# Patient Record
Sex: FEMALE | Race: WHITE
[De-identification: names, ages, dates, MRNs, and addresses within clinical notes are randomized per-mention and may not be internally consistent; named-entity substitution may affect disease eponyms.]

---

## 2017-06-15 ENCOUNTER — HOSPITAL ENCOUNTER (OUTPATIENT)
Dept: HOSPITAL 53 - M SFHCPLAZ | Age: 82
End: 2017-06-15
Attending: FAMILY MEDICINE
Payer: MEDICARE

## 2017-06-15 DIAGNOSIS — K62.5: Primary | ICD-10-CM

## 2017-06-15 DIAGNOSIS — I11.9: ICD-10-CM

## 2017-06-15 LAB
ANION GAP SERPL CALC-SCNC: 7 MEQ/L (ref 8–16)
BUN SERPL-MCNC: 27 MG/DL (ref 7–18)
CALCIUM SERPL-MCNC: 9.5 MG/DL (ref 8.8–10.2)
CHLORIDE SERPL-SCNC: 97 MEQ/L (ref 98–107)
CO2 SERPL-SCNC: 30 MEQ/L (ref 21–32)
CREAT SERPL-MCNC: 1.13 MG/DL (ref 0.55–1.02)
ERYTHROCYTE [DISTWIDTH] IN BLOOD BY AUTOMATED COUNT: 13.6 % (ref 11.5–14.5)
GFR SERPL CREATININE-BSD FRML MDRD: 48.7 ML/MIN/{1.73_M2} (ref 32–?)
GLUCOSE SERPL-MCNC: 89 MG/DL (ref 83–110)
MCH RBC QN AUTO: 31.3 PG (ref 27–33)
MCHC RBC AUTO-ENTMCNC: 34.5 G/DL (ref 32–36.5)
MCV RBC AUTO: 90.8 FL (ref 80–96)
PLATELET # BLD AUTO: 206 K/MM3 (ref 150–450)
POTASSIUM SERPL-SCNC: 4 MEQ/L (ref 3.5–5.1)
SODIUM SERPL-SCNC: 134 MEQ/L (ref 136–145)
WBC # BLD AUTO: 8.8 K/MM3 (ref 4–10)

## 2017-06-15 PROCEDURE — 85027 COMPLETE CBC AUTOMATED: CPT

## 2017-06-15 PROCEDURE — 80048 BASIC METABOLIC PNL TOTAL CA: CPT

## 2017-12-18 ENCOUNTER — HOSPITAL ENCOUNTER (OUTPATIENT)
Dept: HOSPITAL 53 - M RAD | Age: 82
End: 2017-12-18
Attending: FAMILY MEDICINE
Payer: MEDICARE

## 2017-12-18 DIAGNOSIS — R09.89: Primary | ICD-10-CM

## 2017-12-18 NOTE — REP
CAROTID ULTRASOUND:

 

Real-time ultrasound evaluation and duplex Doppler interrogation of the

extracranial carotid vasculature is performed.  There is mild plaquing and

narrowing in both carotid bulbs extending into the internal and external carotid

arteries.  Luminal narrowing is less than 50%.  There is no evidence of

hemodynamically significant stenosis of either internal carotid artery.  Normal

flow velocities are seen. The vertebral arteries demonstrate normal direction of

flow.

 

                                                                RIGHT

LEFT

 

Peak systolic velocity ICA                   60.1 cm/s                      77.7

cm/s

 

End diastolic velocity ICA                  11.7 cm/s                      14.6

cm/s

 

Peak systolic velocity CCA                  64 cm/s                     75.6

cm/s

 

Peak systolic velocity ECA                  63.5 cm/s                     95.4

cm/s

 

ICA/CCA ratio                              0.94

1.03

 

IMPRESSION:

 

Bilateral luminal narrowing of the internal carotid arteries less than 50%.  No

evidence of hemodynamically significant stenosis.

 

 

Signed by

Rodríguez Fernandez MD 12/18/2017 02:25 P

## 2018-01-09 ENCOUNTER — HOSPITAL ENCOUNTER (OUTPATIENT)
Dept: HOSPITAL 53 - M SFHCPLAZ | Age: 83
End: 2018-01-09
Attending: FAMILY MEDICINE
Payer: MEDICARE

## 2018-01-09 DIAGNOSIS — I11.9: Primary | ICD-10-CM

## 2018-01-09 DIAGNOSIS — E03.9: ICD-10-CM

## 2018-01-09 DIAGNOSIS — E83.42: ICD-10-CM

## 2018-01-09 LAB
ANION GAP: 9 MEQ/L (ref 8–16)
BLOOD UREA NITROGEN: 29 MG/DL (ref 7–18)
CALCIUM LEVEL: 8.8 MG/DL (ref 8.8–10.2)
CARBON DIOXIDE LEVEL: 27 MEQ/L (ref 21–32)
CHLORIDE LEVEL: 102 MEQ/L (ref 98–107)
CREATININE FOR GFR: 1.15 MG/DL (ref 0.55–1.02)
GFR SERPL CREATININE-BSD FRML MDRD: 47.6 ML/MIN/{1.73_M2} (ref 32–?)
GLUCOSE, FASTING: 113 MG/DL (ref 83–110)
MAGNESIUM LEVEL: 2.5 MG/DL (ref 1.8–2.4)
POTASSIUM SERUM: 4.2 MEQ/L (ref 3.5–5.1)
SODIUM LEVEL: 138 MEQ/L (ref 136–145)
THYROID STIMULATING HORMONE: 6.96 UIU/ML (ref 0.36–3.74)

## 2018-01-09 PROCEDURE — 83735 ASSAY OF MAGNESIUM: CPT

## 2018-06-22 ENCOUNTER — HOSPITAL ENCOUNTER (EMERGENCY)
Dept: HOSPITAL 53 - M ED | Age: 83
Discharge: HOME | End: 2018-06-22
Payer: MEDICARE

## 2018-06-22 DIAGNOSIS — Z88.1: ICD-10-CM
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
## 2018-06-22 DIAGNOSIS — Z98.61: ICD-10-CM

## 2018-06-22 DIAGNOSIS — S50.02XA: Primary | ICD-10-CM

## 2018-06-22 DIAGNOSIS — Z79.01: ICD-10-CM

## 2018-06-22 DIAGNOSIS — I10: ICD-10-CM

## 2018-06-22 DIAGNOSIS — Z79.899: ICD-10-CM
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
## 2018-06-22 DIAGNOSIS — M19.022: ICD-10-CM

## 2018-06-22 DIAGNOSIS — I25.10: ICD-10-CM

## 2018-06-22 DIAGNOSIS — M54.9: ICD-10-CM
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
## 2018-06-22 DIAGNOSIS — K21.9: ICD-10-CM
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
## 2018-06-22 PROCEDURE — 73080 X-RAY EXAM OF ELBOW: CPT

## 2018-07-27 ENCOUNTER — HOSPITAL ENCOUNTER (OUTPATIENT)
Dept: HOSPITAL 53 - M LABDRAW1 | Age: 83
End: 2018-07-27
Attending: INTERNAL MEDICINE
Payer: MEDICARE

## 2018-07-27 DIAGNOSIS — I48.91: Primary | ICD-10-CM

## 2018-07-27 LAB
ALBUMIN/GLOBULIN RATIO: 1.31 (ref 1–1.93)
ALBUMIN: 3.8 GM/DL (ref 3.2–5.2)
ALKALINE PHOSPHATASE: 59 U/L (ref 45–117)
ALT SERPL W P-5'-P-CCNC: 21 U/L (ref 12–78)
ANION GAP: 9 MEQ/L (ref 8–16)
AST SERPL-CCNC: 21 U/L (ref 7–37)
BILIRUBIN,TOTAL: 1.2 MG/DL (ref 0.2–1)
BLOOD UREA NITROGEN: 26 MG/DL (ref 7–18)
CALCIUM LEVEL: 9.1 MG/DL (ref 8.8–10.2)
CARBON DIOXIDE LEVEL: 27 MEQ/L (ref 21–32)
CHLORIDE LEVEL: 103 MEQ/L (ref 98–107)
CREATININE FOR GFR: 1.23 MG/DL (ref 0.55–1.3)
DIGOXIN LEVEL: 1.6 NG/ML (ref 0.5–2)
GFR SERPL CREATININE-BSD FRML MDRD: 44.1 ML/MIN/{1.73_M2} (ref 32–?)
GLUCOSE, FASTING: 91 MG/DL (ref 70–100)
HEMATOCRIT: 32.6 % (ref 36–47)
HEMOGLOBIN: 10.9 G/DL (ref 12–15.5)
MEAN CORPUSCULAR HEMOGLOBIN: 29.9 PG (ref 27–33)
MEAN CORPUSCULAR HGB CONC: 33.4 G/DL (ref 32–36.5)
MEAN CORPUSCULAR VOLUME: 89.6 FL (ref 80–96)
NRBC BLD AUTO-RTO: 0 % (ref 0–0)
NT-PRO BNP: 6406 PG/ML (ref ?–450)
PLATELET COUNT, AUTOMATED: 170 10^3/UL (ref 150–450)
POTASSIUM SERUM: 3.9 MEQ/L (ref 3.5–5.1)
RED BLOOD COUNT: 3.64 10^6/UL (ref 4–5.4)
RED CELL DISTRIBUTION WIDTH: 14 % (ref 11.5–14.5)
SODIUM LEVEL: 139 MEQ/L (ref 136–145)
TOTAL PROTEIN: 6.7 GM/DL (ref 6.4–8.2)
TROPONIN I: 0.02 NG/ML (ref ?–0.1)
WHITE BLOOD COUNT: 8 10^3/UL (ref 4–10)

## 2018-07-27 PROCEDURE — 80162 ASSAY OF DIGOXIN TOTAL: CPT

## 2019-08-14 ENCOUNTER — HOSPITAL ENCOUNTER (OUTPATIENT)
Dept: HOSPITAL 53 - M SFHCPLAZ | Age: 84
End: 2019-08-14
Attending: FAMILY MEDICINE
Payer: MEDICARE

## 2019-08-14 DIAGNOSIS — N18.3: ICD-10-CM

## 2019-08-14 DIAGNOSIS — E83.42: Primary | ICD-10-CM

## 2019-08-14 DIAGNOSIS — E03.9: ICD-10-CM

## 2019-08-14 LAB
BUN SERPL-MCNC: 38 MG/DL (ref 7–18)
CALCIUM SERPL-MCNC: 9.5 MG/DL (ref 8.8–10.2)
CHLORIDE SERPL-SCNC: 103 MEQ/L (ref 98–107)
CO2 SERPL-SCNC: 30 MEQ/L (ref 21–32)
CREAT SERPL-MCNC: 1.56 MG/DL (ref 0.55–1.3)
GFR SERPL CREATININE-BSD FRML MDRD: 33.3 ML/MIN/{1.73_M2} (ref 32–?)
GLUCOSE SERPL-MCNC: 99 MG/DL (ref 70–100)
HCT VFR BLD AUTO: 36.4 % (ref 36–47)
HGB BLD-MCNC: 11.7 G/DL (ref 12–15.5)
MAGNESIUM SERPL-MCNC: 2.5 MG/DL (ref 1.8–2.4)
MCH RBC QN AUTO: 30.5 PG (ref 27–33)
MCHC RBC AUTO-ENTMCNC: 32.1 G/DL (ref 32–36.5)
MCV RBC AUTO: 95 FL (ref 80–96)
PLATELET # BLD AUTO: 211 10^3/UL (ref 150–450)
POTASSIUM SERPL-SCNC: 4.3 MEQ/L (ref 3.5–5.1)
RBC # BLD AUTO: 3.83 10^6/UL (ref 4–5.4)
SODIUM SERPL-SCNC: 139 MEQ/L (ref 136–145)
TSH SERPL DL<=0.005 MIU/L-ACNC: 0.99 UIU/ML (ref 0.36–3.74)
WBC # BLD AUTO: 7.2 10^3/UL (ref 4–10)

## 2019-08-26 ENCOUNTER — HOSPITAL ENCOUNTER (OUTPATIENT)
Dept: HOSPITAL 53 - M SFHCPLAZ | Age: 84
End: 2019-08-26
Attending: FAMILY MEDICINE
Payer: MEDICARE

## 2019-08-26 DIAGNOSIS — N18.3: Primary | ICD-10-CM

## 2019-08-26 LAB
BUN SERPL-MCNC: 32 MG/DL (ref 7–18)
CALCIUM SERPL-MCNC: 9.1 MG/DL (ref 8.8–10.2)
CHLORIDE SERPL-SCNC: 103 MEQ/L (ref 98–107)
CO2 SERPL-SCNC: 25 MEQ/L (ref 21–32)
CREAT SERPL-MCNC: 1.38 MG/DL (ref 0.55–1.3)
GFR SERPL CREATININE-BSD FRML MDRD: 38.4 ML/MIN/{1.73_M2} (ref 32–?)
GLUCOSE SERPL-MCNC: 84 MG/DL (ref 70–100)
POTASSIUM SERPL-SCNC: 4.1 MEQ/L (ref 3.5–5.1)
SODIUM SERPL-SCNC: 135 MEQ/L (ref 136–145)

## 2019-11-19 ENCOUNTER — HOSPITAL ENCOUNTER (OUTPATIENT)
Dept: HOSPITAL 53 - M SFHCPLAZ | Age: 84
End: 2019-11-19
Attending: FAMILY MEDICINE
Payer: MEDICARE

## 2019-11-19 DIAGNOSIS — N18.3: ICD-10-CM

## 2019-11-19 DIAGNOSIS — E83.42: ICD-10-CM

## 2019-11-19 DIAGNOSIS — I11.9: Primary | ICD-10-CM

## 2019-11-19 LAB
BUN SERPL-MCNC: 24 MG/DL (ref 7–18)
CALCIUM SERPL-MCNC: 8.7 MG/DL (ref 8.8–10.2)
CHLORIDE SERPL-SCNC: 102 MEQ/L (ref 98–107)
CO2 SERPL-SCNC: 27 MEQ/L (ref 21–32)
CREAT SERPL-MCNC: 1.24 MG/DL (ref 0.55–1.3)
GFR SERPL CREATININE-BSD FRML MDRD: 43.5 ML/MIN/{1.73_M2} (ref 32–?)
GLUCOSE SERPL-MCNC: 91 MG/DL (ref 70–100)
MAGNESIUM SERPL-MCNC: 1.9 MG/DL (ref 1.8–2.4)
POTASSIUM SERPL-SCNC: 4.3 MEQ/L (ref 3.5–5.1)
SODIUM SERPL-SCNC: 136 MEQ/L (ref 136–145)

## 2019-11-19 PROCEDURE — 83735 ASSAY OF MAGNESIUM: CPT

## 2019-11-19 PROCEDURE — 36415 COLL VENOUS BLD VENIPUNCTURE: CPT

## 2019-11-19 PROCEDURE — 80048 BASIC METABOLIC PNL TOTAL CA: CPT

## 2020-02-24 ENCOUNTER — HOSPITAL ENCOUNTER (OUTPATIENT)
Dept: HOSPITAL 53 - M PLALAB | Age: 85
End: 2020-02-24
Attending: FAMILY MEDICINE
Payer: MEDICARE

## 2020-02-24 DIAGNOSIS — I11.9: Primary | ICD-10-CM

## 2020-02-24 DIAGNOSIS — N18.3: ICD-10-CM

## 2020-02-24 LAB
BUN SERPL-MCNC: 24 MG/DL (ref 7–18)
CALCIUM SERPL-MCNC: 8.7 MG/DL (ref 8.8–10.2)
CHLORIDE SERPL-SCNC: 109 MEQ/L (ref 98–107)
CO2 SERPL-SCNC: 26 MEQ/L (ref 21–32)
CREAT SERPL-MCNC: 1.14 MG/DL (ref 0.55–1.3)
GFR SERPL CREATININE-BSD FRML MDRD: 47.9 ML/MIN/{1.73_M2} (ref 32–?)
GLUCOSE SERPL-MCNC: 97 MG/DL (ref 70–100)
HCT VFR BLD AUTO: 31.1 % (ref 36–47)
HGB BLD-MCNC: 10.1 G/DL (ref 12–15.5)
MCH RBC QN AUTO: 31.1 PG (ref 27–33)
MCHC RBC AUTO-ENTMCNC: 32.5 G/DL (ref 32–36.5)
MCV RBC AUTO: 95.7 FL (ref 80–96)
PLATELET # BLD AUTO: 185 10^3/UL (ref 150–450)
POTASSIUM SERPL-SCNC: 4.2 MEQ/L (ref 3.5–5.1)
RBC # BLD AUTO: 3.25 10^6/UL (ref 4–5.4)
SODIUM SERPL-SCNC: 140 MEQ/L (ref 136–145)
WBC # BLD AUTO: 4.7 10^3/UL (ref 4–10)

## 2020-02-24 PROCEDURE — 80048 BASIC METABOLIC PNL TOTAL CA: CPT

## 2020-02-24 PROCEDURE — 36415 COLL VENOUS BLD VENIPUNCTURE: CPT

## 2020-02-24 PROCEDURE — 85027 COMPLETE CBC AUTOMATED: CPT

## 2020-02-25 ENCOUNTER — HOSPITAL ENCOUNTER (EMERGENCY)
Dept: HOSPITAL 53 - M ED | Age: 85
Discharge: HOME | End: 2020-02-25
Payer: MEDICARE

## 2020-02-25 VITALS — DIASTOLIC BLOOD PRESSURE: 92 MMHG | SYSTOLIC BLOOD PRESSURE: 144 MMHG

## 2020-02-25 VITALS — HEIGHT: 61 IN | WEIGHT: 172.36 LBS | BODY MASS INDEX: 32.54 KG/M2

## 2020-02-25 DIAGNOSIS — Z79.899: ICD-10-CM

## 2020-02-25 DIAGNOSIS — D68.318: ICD-10-CM

## 2020-02-25 DIAGNOSIS — Z79.890: ICD-10-CM

## 2020-02-25 DIAGNOSIS — Z79.01: ICD-10-CM

## 2020-02-25 DIAGNOSIS — R04.0: Primary | ICD-10-CM

## 2020-02-25 DIAGNOSIS — Z88.1: ICD-10-CM

## 2020-02-25 LAB
HCT VFR BLD AUTO: 30.5 % (ref 36–47)
HGB BLD-MCNC: 9.7 G/DL (ref 12–15.5)
INR PPP: 1.93
MCH RBC QN AUTO: 29.9 PG (ref 27–33)
MCHC RBC AUTO-ENTMCNC: 31.8 G/DL (ref 32–36.5)
MCV RBC AUTO: 94.1 FL (ref 80–96)
PLATELET # BLD AUTO: 194 10^3/UL (ref 150–450)
PROTHROMBIN TIME: 21.8 SECONDS (ref 11.8–14)
RBC # BLD AUTO: 3.24 10^6/UL (ref 4–5.4)
WBC # BLD AUTO: 4.9 10^3/UL (ref 4–10)

## 2020-06-02 ENCOUNTER — HOSPITAL ENCOUNTER (OUTPATIENT)
Dept: HOSPITAL 53 - M PLALAB | Age: 85
End: 2020-06-02
Attending: FAMILY MEDICINE
Payer: MEDICARE

## 2020-06-02 DIAGNOSIS — I11.9: ICD-10-CM

## 2020-06-02 DIAGNOSIS — D50.9: ICD-10-CM

## 2020-06-02 DIAGNOSIS — N18.3: Primary | ICD-10-CM

## 2020-06-02 LAB
BUN SERPL-MCNC: 28 MG/DL (ref 7–18)
CALCIUM SERPL-MCNC: 9.2 MG/DL (ref 8.8–10.2)
CHLORIDE SERPL-SCNC: 105 MEQ/L (ref 98–107)
CO2 SERPL-SCNC: 27 MEQ/L (ref 21–32)
CREAT SERPL-MCNC: 1.3 MG/DL (ref 0.55–1.3)
FERRITIN SERPL-MCNC: 34 NG/ML (ref 8–252)
GFR SERPL CREATININE-BSD FRML MDRD: 41.2 ML/MIN/{1.73_M2} (ref 32–?)
GLUCOSE SERPL-MCNC: 109 MG/DL (ref 70–100)
HCT VFR BLD AUTO: 31.2 % (ref 36–47)
HGB BLD-MCNC: 10.2 G/DL (ref 12–15.5)
IRON SATN MFR SERPL: 20.3 % (ref 13.2–45)
IRON SERPL-MCNC: 86 UG/DL (ref 50–170)
MCH RBC QN AUTO: 30.5 PG (ref 27–33)
MCHC RBC AUTO-ENTMCNC: 32.7 G/DL (ref 32–36.5)
MCV RBC AUTO: 93.4 FL (ref 80–96)
PLATELET # BLD AUTO: 203 10^3/UL (ref 150–450)
POTASSIUM SERPL-SCNC: 4.6 MEQ/L (ref 3.5–5.1)
RBC # BLD AUTO: 3.34 10^6/UL (ref 4–5.4)
SODIUM SERPL-SCNC: 138 MEQ/L (ref 136–145)
TIBC SERPL-MCNC: 424 UG/DL (ref 250–450)
WBC # BLD AUTO: 5.6 10^3/UL (ref 4–10)

## 2020-08-27 ENCOUNTER — HOSPITAL ENCOUNTER (OUTPATIENT)
Dept: HOSPITAL 53 - M PLALAB | Age: 85
End: 2020-08-27
Attending: FAMILY MEDICINE
Payer: MEDICARE

## 2020-08-27 DIAGNOSIS — I12.9: Primary | ICD-10-CM

## 2020-08-27 DIAGNOSIS — E03.9: ICD-10-CM

## 2020-08-27 DIAGNOSIS — N18.3: ICD-10-CM

## 2020-08-27 LAB
BUN SERPL-MCNC: 33 MG/DL (ref 7–18)
CALCIUM SERPL-MCNC: 9.1 MG/DL (ref 8.8–10.2)
CHLORIDE SERPL-SCNC: 107 MEQ/L (ref 98–107)
CO2 SERPL-SCNC: 28 MEQ/L (ref 21–32)
CREAT SERPL-MCNC: 1.24 MG/DL (ref 0.55–1.3)
DIGOXIN SERPL-MCNC: 0.8 NG/ML (ref 0.5–2)
GFR SERPL CREATININE-BSD FRML MDRD: 43.4 ML/MIN/{1.73_M2} (ref 32–?)
GLUCOSE SERPL-MCNC: 87 MG/DL (ref 70–100)
POTASSIUM SERPL-SCNC: 4.4 MEQ/L (ref 3.5–5.1)
SODIUM SERPL-SCNC: 139 MEQ/L (ref 136–145)
TSH SERPL DL<=0.005 MIU/L-ACNC: 0.56 UIU/ML (ref 0.36–3.74)

## 2020-08-27 PROCEDURE — 84443 ASSAY THYROID STIM HORMONE: CPT

## 2020-08-27 PROCEDURE — 36415 COLL VENOUS BLD VENIPUNCTURE: CPT

## 2020-08-27 PROCEDURE — 80048 BASIC METABOLIC PNL TOTAL CA: CPT

## 2020-08-27 PROCEDURE — 80162 ASSAY OF DIGOXIN TOTAL: CPT

## 2020-10-12 ENCOUNTER — HOSPITAL ENCOUNTER (EMERGENCY)
Dept: HOSPITAL 53 - M ED | Age: 85
Discharge: LEFT BEFORE DISCHARGE | End: 2020-10-12
Payer: MEDICARE

## 2020-10-12 VITALS — HEIGHT: 66 IN | WEIGHT: 170.35 LBS | BODY MASS INDEX: 27.38 KG/M2

## 2020-10-12 VITALS — DIASTOLIC BLOOD PRESSURE: 77 MMHG | SYSTOLIC BLOOD PRESSURE: 180 MMHG
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
## 2020-10-12 DIAGNOSIS — I48.91: ICD-10-CM

## 2020-10-12 DIAGNOSIS — Z88.1: ICD-10-CM
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
## 2020-10-12 DIAGNOSIS — M85.89: ICD-10-CM

## 2020-10-12 DIAGNOSIS — Z79.899: ICD-10-CM

## 2020-10-12 DIAGNOSIS — N18.9: ICD-10-CM

## 2020-10-12 DIAGNOSIS — S01.81XA: Primary | ICD-10-CM

## 2020-10-12 DIAGNOSIS — E03.9: ICD-10-CM

## 2020-10-12 DIAGNOSIS — Z53.20: ICD-10-CM

## 2020-10-12 DIAGNOSIS — Z91.81: ICD-10-CM

## 2020-10-12 DIAGNOSIS — Z87.891: ICD-10-CM
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
## 2020-10-12 DIAGNOSIS — I10: ICD-10-CM

## 2020-10-12 DIAGNOSIS — Z95.0: ICD-10-CM

## 2020-10-12 DIAGNOSIS — Z79.01: ICD-10-CM

## 2020-10-12 NOTE — REPVR
PROCEDURE INFORMATION: 

Exam: XR Thoracic Spine, 3 Views 

Exam date and time: 10/12/2020 10:34 AM 

Age: 89 years old 

Clinical indication: Injury or trauma; Fall; Swelling (edema); Injury details: 

Spinous process tenderness; Additional info: Trauma, spinous process tenderness 



TECHNIQUE: 

Imaging protocol: XR of the thoracic spine, 3 views. 



COMPARISON: 

No relevant prior studies available. 



FINDINGS: 

Tubes, catheters and devices: A left-sided pacemaker is present. 



Vertebrae: There is a mild S-shaped thoracic scoliosis. The thoracic spine is 

straightened. Alignment is otherwise maintained. Vertebral body heights are 

intact. The pedicles appear intact. No acute fracture is evident. There is 

multilevel facet arthrosis, disc space narrowing and marginal osteophyte 

formation. 

Soft tissues: Grossly unremarkable. 



Vasculature: Atherosclerotic vascular calcifications are noted. 



Other findings: The bones appear osteopenic. 



IMPRESSION: 

1. No acute fracture evident. CT would be more sensitive to fracture as 

clinically appropriate. 

2. Apparent osteopenia. 

3. Spondylosis. 



Electronically signed by: Tunde Sol On 10/12/2020  10:42:04 AM

## 2020-10-12 NOTE — REPVR
PROCEDURE INFORMATION: 

Exam: CT Head Without Contrast 

Exam date and time: 10/12/2020 9:29 AM 

Age: 89 years old 

Clinical indication: Injury or trauma; Fall; Blunt trauma (contusions or 

hematomas); Additional info: Fall on thinners 



TECHNIQUE: 

Imaging protocol: Computed tomography of the head without contrast. 

Radiation optimization: All CT scans at this facility use at least one of these 

dose optimization techniques: automated exposure control; mA and/or kV 

adjustment per patient size (includes targeted exams where dose is matched to 

clinical indication); or iterative reconstruction. 



COMPARISON: 

CT Head without contrast 5/12/2016 4:36 PM 



FINDINGS: 

Brain: Mild degrees of lucency in the white matter are most suggestive of 

chronic microvascular ischemic disease. These are mildly progressive. There is 

no evidence for large acute cortical infarct. There is again coarse dural 

calcification along the right aspect of the tentorium. No intracranial 

hemorrhage or extraaxial collection is identified. There is no significant 

intracranial mass effect. 

Cerebral ventricles: The ventricles and sulci are stable in configuration, with 

similar atrophy. 

Bones/joints: Unremarkable. No acute fracture. 

Paranasal sinuses: Visualized sinuses are unremarkable. No fluid levels. 

Mastoid air cells: Visualized mastoid air cells are well aerated. 

Vasculature: Intracranial atherosclerotic vascular calcifications are again 

present. 

Soft tissues: Unremarkable. 



IMPRESSION: 

1. No CT evidence for acute intracranial abnormality. 

2. Mild progression of chronic white matter disease as compared with 05/12/16, 

with similar atrophy. 



Electronically signed by: Tunde Sol On 10/12/2020  09:57:05 AM

## 2020-10-12 NOTE — REPVR
PROCEDURE INFORMATION: 

Exam: XR Right Humerus 

Exam date and time: 10/12/2020 10:34 AM 

Age: 89 years old 

Clinical indication: Injury or trauma; Fall; Swelling (edema); Arm, upper; 

Right; Additional info: Trauma, pain mid humerous 



TECHNIQUE: 

Imaging protocol: XR Right humerus 

Views: 2 or more views. 



COMPARISON: 

CR Elbow, complete 6/22/2018 2:20 PM 



FINDINGS: 

Bones/joints: No acute fracture is identified. The bones appear osteopenic. The 

partially evaluated shoulder and elbow joints demonstrate degenerative changes. 

Soft tissues: The soft tissues appear grossly unremarkable. 



IMPRESSION: 

1. No acute fracture identified. 

2. Apparent osteopenia. 



Electronically signed by: Tunde Sol On 10/12/2020  10:43:20 AM

## 2020-10-12 NOTE — REPVR
PROCEDURE INFORMATION: 

Exam: XR Chest, 2 Views 

Exam date and time: 10/12/2020 10:34 AM 

Age: 89 years old 

Clinical indication: Injury or trauma; Fall; Swelling (edema); Injury details: 

Bruising on left side. Left rib tenderness; Additional info: Trauma, left 

posterior bruising/tendernss along ribs 



TECHNIQUE: 

Imaging protocol: XR of the chest 

Views: 2 views. 



COMPARISON: 

SR - Chest, 2 view PA, Lat 5/12/2016 4:36:08 PM 



FINDINGS: 

Tubes, catheters and devices: A left-sided pacemaker is again present. 



Lungs: Unremarkable. No consolidation. 

Pleural space: Unremarkable. No pleural effusion. No pneumothorax. 

Heart/Mediastinum: The cardiomediastinal silhouette is fairly stable in 

appearance. 

Bones/joints: Degenerative changes again involve the spine and shoulders. 



IMPRESSION: 

No evidence for acute pulmonary disease. 



Electronically signed by: Tunde Sol On 10/12/2020  10:48:00 AM

## 2020-10-12 NOTE — REPVR
PROCEDURE INFORMATION: 

Exam: CT Cervical Spine Without Contrast 

Exam date and time: 10/12/2020 9:29 AM 

Age: 89 years old 

Clinical indication: Injury or trauma; Fall; Blunt trauma; Additional info: 

Fall on thinners 



TECHNIQUE: 

Imaging protocol: Computed tomography images of the cervical spine without 

contrast. 

Radiation optimization: All CT scans at this facility use at least one of these 

dose optimization techniques: automated exposure control; mA and/or kV 

adjustment per patient size (includes targeted exams where dose is matched to 

clinical indication); or iterative reconstruction. 



COMPARISON: 

US Duplex,carotid (complete) 12/18/2017 12:35 PM 



FINDINGS: 

Tubes, catheters and devices: A left-sided pacemaker is present. 

Vertebrae: Vertebral body heights are intact. Alignment is maintained. 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 No acute fracture is identified. 

Discs/Spinal canal/Neural foramina: There is multilevel spondylosis with 

variable osteophytic encroachment of several neural foramina. No significant 

spinal stenosis is evident. 



Other bones/joints: The bones appear osteopenic. 

Soft tissues: The prevertebral soft tissues are not significantly swollen. 

Lungs: There is emphysematous change at the lung apices. 

Pleural space: No apical pneumothorax is identified. 

Vasculature: Atherosclerotic vascular calcifications are noted. 



IMPRESSION: 

1. No acute fracture or dislocation identified. 

2. Apparent osteopenia. 

3. Spondylosis without significant spinal stenosis evident. 

4. Apical emphysematous disease. 



Electronically signed by: Tunde Sol On 10/12/2020  09:57:59 AM

## 2020-11-16 ENCOUNTER — HOSPITAL ENCOUNTER (INPATIENT)
Dept: HOSPITAL 53 - M ED | Age: 85
LOS: 4 days | Discharge: NURSING, REHAB OR LONG-TERM CARE | DRG: 563 | End: 2020-11-20
Attending: FAMILY MEDICINE | Admitting: FAMILY MEDICINE
Payer: MEDICAID

## 2020-11-16 VITALS — SYSTOLIC BLOOD PRESSURE: 154 MMHG | DIASTOLIC BLOOD PRESSURE: 72 MMHG

## 2020-11-16 VITALS — DIASTOLIC BLOOD PRESSURE: 88 MMHG | SYSTOLIC BLOOD PRESSURE: 142 MMHG

## 2020-11-16 VITALS — WEIGHT: 140.88 LBS | HEIGHT: 66 IN | BODY MASS INDEX: 22.64 KG/M2

## 2020-11-16 DIAGNOSIS — Z98.42: ICD-10-CM
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
## 2020-11-16 DIAGNOSIS — Z88.1: ICD-10-CM

## 2020-11-16 DIAGNOSIS — M77.32: ICD-10-CM

## 2020-11-16 DIAGNOSIS — J44.9: ICD-10-CM

## 2020-11-16 DIAGNOSIS — E03.9: ICD-10-CM

## 2020-11-16 DIAGNOSIS — Z98.41: ICD-10-CM

## 2020-11-16 DIAGNOSIS — I49.5: ICD-10-CM

## 2020-11-16 DIAGNOSIS — Z87.81: ICD-10-CM

## 2020-11-16 DIAGNOSIS — I48.91: ICD-10-CM
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
## 2020-11-16 DIAGNOSIS — H35.30: ICD-10-CM

## 2020-11-16 DIAGNOSIS — I50.9: ICD-10-CM

## 2020-11-16 DIAGNOSIS — I13.0: ICD-10-CM

## 2020-11-16 DIAGNOSIS — Z79.01: ICD-10-CM

## 2020-11-16 DIAGNOSIS — S92.415A: Primary | ICD-10-CM

## 2020-11-16 DIAGNOSIS — Z79.899: ICD-10-CM

## 2020-11-16 DIAGNOSIS — Z95.0: ICD-10-CM

## 2020-11-16 DIAGNOSIS — N18.30: ICD-10-CM

## 2020-11-16 DIAGNOSIS — S92.515A: ICD-10-CM

## 2020-11-16 DIAGNOSIS — K58.9: ICD-10-CM

## 2020-11-16 DIAGNOSIS — F32.9: ICD-10-CM
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
## 2020-11-16 DIAGNOSIS — R26.2: ICD-10-CM

## 2020-11-16 DIAGNOSIS — M85.80: ICD-10-CM
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
## 2020-11-16 LAB
ALBUMIN SERPL BCG-MCNC: 3.9 GM/DL (ref 3.2–5.2)
ALT SERPL W P-5'-P-CCNC: 12 U/L (ref 12–78)
BASOPHILS # BLD AUTO: 0 10^3/UL (ref 0–0.2)
BASOPHILS NFR BLD AUTO: 0.4 % (ref 0–1)
BILIRUB CONJ SERPL-MCNC: 0.3 MG/DL (ref 0–0.2)
BILIRUB SERPL-MCNC: 0.8 MG/DL (ref 0.2–1)
BUN SERPL-MCNC: 39 MG/DL (ref 7–18)
CALCIUM SERPL-MCNC: 9.5 MG/DL (ref 8.8–10.2)
CHLORIDE SERPL-SCNC: 106 MEQ/L (ref 98–107)
CK MB CFR.DF SERPL CALC: 1.78
CK MB SERPL-MCNC: 1.3 NG/ML (ref ?–3.6)
CK SERPL-CCNC: 73 U/L (ref 26–192)
CO2 SERPL-SCNC: 27 MEQ/L (ref 21–32)
CREAT SERPL-MCNC: 1.4 MG/DL (ref 0.55–1.3)
DIGOXIN SERPL-MCNC: 0.4 NG/ML (ref 0.5–2)
EOSINOPHIL # BLD AUTO: 0 10^3/UL (ref 0–0.5)
EOSINOPHIL NFR BLD AUTO: 0.2 % (ref 0–3)
GFR SERPL CREATININE-BSD FRML MDRD: 37.7 ML/MIN/{1.73_M2} (ref 32–?)
GLUCOSE SERPL-MCNC: 106 MG/DL (ref 70–100)
HCT VFR BLD AUTO: 32.4 % (ref 36–47)
HGB BLD-MCNC: 10.3 G/DL (ref 12–15.5)
INR PPP: 2.31
LYMPHOCYTES # BLD AUTO: 1.2 10^3/UL (ref 1.5–5)
LYMPHOCYTES NFR BLD AUTO: 14.7 % (ref 24–44)
MCH RBC QN AUTO: 30.6 PG (ref 27–33)
MCHC RBC AUTO-ENTMCNC: 31.8 G/DL (ref 32–36.5)
MCV RBC AUTO: 96.1 FL (ref 80–96)
MONOCYTES # BLD AUTO: 1 10^3/UL (ref 0–0.8)
MONOCYTES NFR BLD AUTO: 12.6 % (ref 0–5)
NEUTROPHILS # BLD AUTO: 5.8 10^3/UL (ref 1.5–8.5)
NEUTROPHILS NFR BLD AUTO: 70.9 % (ref 36–66)
NT-PRO BNP: 3015 PG/ML (ref ?–450)
PLATELET # BLD AUTO: 220 10^3/UL (ref 150–450)
POTASSIUM SERPL-SCNC: 4.7 MEQ/L (ref 3.5–5.1)
PROT SERPL-MCNC: 6.9 GM/DL (ref 6.4–8.2)
PROTHROMBIN TIME: 25.9 SECONDS (ref 12.5–14.3)
RBC # BLD AUTO: 3.37 10^6/UL (ref 4–5.4)
SODIUM SERPL-SCNC: 139 MEQ/L (ref 136–145)
T4 SERPL-MCNC: 10.2 UG/DL (ref 4.5–12)
TROPONIN I SERPL-MCNC: < 0.02 NG/ML (ref ?–0.1)
TSH SERPL DL<=0.005 MIU/L-ACNC: 2.63 UIU/ML (ref 0.36–3.74)
WBC # BLD AUTO: 8.2 10^3/UL (ref 4–10)

## 2020-11-16 RX ADMIN — OMEPRAZOLE SCH MG: 20 CAPSULE, DELAYED RELEASE ORAL at 20:18

## 2020-11-16 RX ADMIN — DEXTROSE MONOHYDRATE SCH MG: 50 INJECTION, SOLUTION INTRAVENOUS at 17:39

## 2020-11-16 RX ADMIN — POTASSIUM CHLORIDE SCH MEQ: 750 TABLET, EXTENDED RELEASE ORAL at 17:38

## 2020-11-16 RX ADMIN — DOXEPIN HYDROCHLORIDE SCH MG: 10 CAPSULE ORAL at 20:52

## 2020-11-16 RX ADMIN — SERTRALINE HYDROCHLORIDE SCH MG: 100 TABLET ORAL at 17:38

## 2020-11-16 RX ADMIN — WARFARIN SODIUM SCH MG: 7.5 TABLET ORAL at 17:39

## 2020-11-16 RX ADMIN — DIGOXIN SCH MG: 250 TABLET ORAL at 17:38

## 2020-11-16 NOTE — REP
INDICATION:

DYSPNEA/COUGH



COMPARISON:

05/12/2016.



TECHNIQUE:

Four views left foot performed.



FINDINGS:

There is a nondisplaced fracture at the base of the 1st proximal phalanx medially.

This extends into the metatarsophalangeal joint.  There are old healed fractures of

the distal 3rd through 5th metatarsals.  No other acute fracture or dislocation is

seen.  There is mild inferior calcaneal spurring.



IMPRESSION:

Nondisplaced intra-articular fracture base of 1st proximal phalanx medially, extending

into the metatarsophalangeal joint.  Old healed fractures distal 3rd through 5th

metatarsals.





<Electronically signed by Rodríguez Fernandez > 11/16/20 [id number]

## 2020-11-16 NOTE — ECGEPIP
[facility] - ED

                                       

                                       Test Date:    2020

Pat Name:     ALICE ECKERT            Department:   

Patient ID:   [id number]                 Room:         -

Gender:       Female                   Technician:   JERRICA

:          [id number]               Requested By: TONO SAWYER

Order Number: [id number]     Reading MD:   Jose M Wood

                                 Measurements

Intervals                              Axis          

Rate:         93                       P:            

SC:           0                        QRS:          95

QRSD:         110                      T:            -69

QT:           345                                    

QTc:          430                                    

                           Interpretive Statements

ATRIAL FIBRILLATION

BORDERLINE RIGHT AXIS DEVIATION

MODERATE INTRAVENTRICULAR CONDUCTION DELAY

NSTTW ABNORMALITY(S)

NO PRIORS FOR COMPARISON

Electronically Signed on 2020 23:55:45 EST by Jose M Wood

## 2020-11-16 NOTE — REP
INDICATION:

DYSPNEA/COUGH



COMPARISON:

None.



TECHNIQUE:

Four views left knee performed.



FINDINGS:

There is no evidence of acute fracture, dislocation, or intrinsic bone disease.There

is an old healed fracture of the proximal fibula.  There is mild medial joint space

narrowing.  Vascular calcifications are seen posteriorly.  There is no definite joint

effusion.



IMPRESSION:

No acute fracture or dislocation.





<Electronically signed by Rodríguez Fernandez > 11/16/20 0688

## 2020-11-16 NOTE — REPVR
PROCEDURE INFORMATION: 

Exam: CT Cervical Spine Without Contrast 

Exam date and time: 11/16/2020 10:07 AM 

Age: 89 years old 

Clinical indication: Neck pain; Additional info: Fall 



TECHNIQUE: 

Imaging protocol: Computed tomography images of the cervical spine without 

contrast. 

Radiation optimization: All CT scans at this facility use at least one of these 

dose optimization techniques: automated exposure control; mA and/or kV 

adjustment per patient size (includes targeted exams where dose is matched to 

clinical indication); or iterative reconstruction. 



COMPARISON: 

CT Spine,cervical w/o contrast 10/12/2020 9:24 AM 



FINDINGS: 

Tubes, catheters and devices: A left-sided cardiac pacemaker is again present. 



Bones/joints: Vertebral body heights are intact. Alignment is maintained. The 

bones again appear osteopenic. The C1 posterior arch is again congenitally 

bifid. No acute fracture is identified. 

Discs/Spinal canal/Neural foramina: There is multilevel spondylosis with 

variable osteophytic encroachment of several neural foramina. CT is not optimal 

for the evaluation of the discs, neural foramina or spinal canal or cord. The 

appearance is not significantly changed. No significant spinal stenosis is 

evident. 



Soft tissues: The prevertebral soft tissues are not significantly swollen. 



Lungs: There are again emphysematous changes at the lung apices. 

Pleural space: No apical pneumothorax is identified. 

Vasculature: Atherosclerotic vascular calcifications are again present. 



IMPRESSION: 

1. No acute fracture or dislocation identified. 

2. Apparent osteopenia, as on 10/12/20. 

3. Fairly similar appearance of spondylosis. 

4. Persistent apical emphysematous disease. 



Electronically signed by: Tunde Sol On 11/16/2020  10:43:31 AM

## 2020-11-16 NOTE — REPVR
PROCEDURE INFORMATION: 

Exam: CT Head Without Contrast 

Exam date and time: 11/16/2020 10:07 AM 

Age: 89 years old 

Clinical indication: Pain; Headache; Additional info: Fall 



TECHNIQUE: 

Imaging protocol: Computed tomography of the head without contrast. 

Radiation optimization: All CT scans at this facility use at least one of these 

dose optimization techniques: automated exposure control; mA and/or kV 

adjustment per patient size (includes targeted exams where dose is matched to 

clinical indication); or iterative reconstruction. 



COMPARISON: 

CT Head without contrast 10/12/2020 9:24 AM 



FINDINGS: 

Brain: Lucencies in the white matter, most suggestive of chronic microvascular 

ischemic disease, do not appear significantly changed. There is no evidence for 

large acute cortical infarct. There is again coarse dural calcification along 

the right aspect of the tentorium. No intracranial hemorrhage or extraaxial 

collection is identified. There is no significant intracranial mass effect. 

Cerebral ventricles: The ventricles and sulci are stable in configuration, with 

similar atrophy. 

Bones/joints: Unremarkable. No acute fracture. 

Paranasal sinuses: Visualized sinuses are unremarkable. No fluid levels. 

Mastoid air cells: Visualized mastoid air cells are well aerated. 

Vasculature: Intracranial atherosclerotic vascular calcifications are again 

present. 

Soft tissues: Unremarkable. 



IMPRESSION: 

No CT evidence for acute intracranial abnormality. 



Electronically signed by: Tunde Sol On 11/16/2020  10:36:16 AM

## 2020-11-16 NOTE — REP
INDICATION:

DYSPNEA/COUGH



COMPARISON:

10/12/2020



TECHNIQUE:

Portable AP view of the chest



FINDINGS:

Stable cardiomegaly with dual lead pacemaker again noted.  Lung fields demonstrate

chronic appearing interstitial changes.  No focal consolidation, effusion, or

pneumothorax.  Skeletal structures demonstrate stable osteopenia and degenerative

changes.



IMPRESSION:

Chronic stable changes.  No acute consolidation or effusion.





<Electronically signed by Alvarez Jose > 11/16/20 1102

## 2020-11-17 VITALS — SYSTOLIC BLOOD PRESSURE: 146 MMHG | DIASTOLIC BLOOD PRESSURE: 90 MMHG

## 2020-11-17 VITALS — SYSTOLIC BLOOD PRESSURE: 124 MMHG | DIASTOLIC BLOOD PRESSURE: 58 MMHG

## 2020-11-17 VITALS — DIASTOLIC BLOOD PRESSURE: 70 MMHG | SYSTOLIC BLOOD PRESSURE: 138 MMHG

## 2020-11-17 VITALS — DIASTOLIC BLOOD PRESSURE: 82 MMHG | SYSTOLIC BLOOD PRESSURE: 138 MMHG

## 2020-11-17 VITALS — DIASTOLIC BLOOD PRESSURE: 69 MMHG | SYSTOLIC BLOOD PRESSURE: 131 MMHG

## 2020-11-17 VITALS — DIASTOLIC BLOOD PRESSURE: 68 MMHG | SYSTOLIC BLOOD PRESSURE: 146 MMHG

## 2020-11-17 LAB
BUN SERPL-MCNC: 30 MG/DL (ref 7–18)
CALCIUM SERPL-MCNC: 9.2 MG/DL (ref 8.8–10.2)
CHLORIDE SERPL-SCNC: 105 MEQ/L (ref 98–107)
CO2 SERPL-SCNC: 31 MEQ/L (ref 21–32)
CREAT SERPL-MCNC: 1.31 MG/DL (ref 0.55–1.3)
GFR SERPL CREATININE-BSD FRML MDRD: 40.7 ML/MIN/{1.73_M2} (ref 32–?)
GLUCOSE SERPL-MCNC: 114 MG/DL (ref 70–100)
HCT VFR BLD AUTO: 33 % (ref 36–47)
HGB BLD-MCNC: 10.7 G/DL (ref 12–15.5)
INR PPP: 2.16
MCH RBC QN AUTO: 31.2 PG (ref 27–33)
MCHC RBC AUTO-ENTMCNC: 32.4 G/DL (ref 32–36.5)
MCV RBC AUTO: 96.2 FL (ref 80–96)
PLATELET # BLD AUTO: 205 10^3/UL (ref 150–450)
POTASSIUM SERPL-SCNC: 3.3 MEQ/L (ref 3.5–5.1)
PROTHROMBIN TIME: 24.6 SECONDS (ref 12.5–14.3)
RBC # BLD AUTO: 3.43 10^6/UL (ref 4–5.4)
SODIUM SERPL-SCNC: 141 MEQ/L (ref 136–145)
WBC # BLD AUTO: 6 10^3/UL (ref 4–10)

## 2020-11-17 RX ADMIN — DOXEPIN HYDROCHLORIDE SCH MG: 10 CAPSULE ORAL at 20:44

## 2020-11-17 RX ADMIN — LEVOTHYROXINE SODIUM SCH MCG: 137 TABLET ORAL at 06:05

## 2020-11-17 RX ADMIN — OMEPRAZOLE SCH MG: 20 CAPSULE, DELAYED RELEASE ORAL at 09:04

## 2020-11-17 RX ADMIN — DEXTROSE MONOHYDRATE SCH MG: 50 INJECTION, SOLUTION INTRAVENOUS at 06:05

## 2020-11-17 RX ADMIN — DIGOXIN SCH MG: 250 TABLET ORAL at 09:05

## 2020-11-17 RX ADMIN — OMEPRAZOLE SCH MG: 20 CAPSULE, DELAYED RELEASE ORAL at 20:44

## 2020-11-17 RX ADMIN — WARFARIN SODIUM SCH MG: 7.5 TABLET ORAL at 18:06

## 2020-11-17 RX ADMIN — POTASSIUM CHLORIDE SCH MEQ: 750 TABLET, EXTENDED RELEASE ORAL at 09:04

## 2020-11-17 RX ADMIN — DEXTROSE MONOHYDRATE SCH MG: 50 INJECTION, SOLUTION INTRAVENOUS at 00:00

## 2020-11-17 RX ADMIN — SERTRALINE HYDROCHLORIDE SCH MG: 100 TABLET ORAL at 09:05

## 2020-11-17 RX ADMIN — DEXTROSE MONOHYDRATE SCH MG: 50 INJECTION, SOLUTION INTRAVENOUS at 12:18

## 2020-11-17 NOTE — IPNPDOC
Text Note


Date of Service


The patient was seen on 11/17/20.





NOTE


Subjective:


Patient was seen and examined this morning at bedside. She is able to respond 

appropriately to my questions. She tells me the main reason she is here is 

because she keeps tripping on her left foot seems to be dragging along the floor

and very painful. Tells me she is not short of breath and is feeling well. Tells

me she lives at home and uses a walker and has a family member lives next door 

who checks in on her. Nurse tells me there is no overnight events.





Objective:


Constitutional: Awake and alert, in no apparent distress


ENT: Sclera are clear. Mucosa is moist. 


Respiratory:  Lungs CTA bilaterally.  No respiratory distress. No use of 

accessory muscles.


Cardiovascular: RRR S1 and S2 are normal, no murmur. no JVD. 


Gastrointestinal: Abdomen is soft, non distended, non tender, BS present. 


Musculoskeletal: 1+ pitting lower extremity edema. 


Neurologic: No focal neurological deficit.  


Mental Status: A&O x3, normal affect. 


Skin: Warm, dry 





Assessment/plan:





# Fracture and left foot big toe: Consulted podiatry . PT/OT. 


# Congestive heart failure: She appears almost euvolemic to me with the 

exception of some mild +1 pitting edema. She denies any dyspnea. I will decrease

her IV Lasix today and switch it to by mouth tomorrow if she continues to do 

well. Fu echo. 


# A. fib: Rate controlled. On warfarin. INR therapeutic. Continue home dose 

warfarin. Continue atenolol for rate control


# Hypothyroidism: Continue home levothyroxine


# Hypertension: Continue atenolol. Monitor and titrate


# Tachybrady syndrome with a pacemaker in 2009.


# COPD stage III: Avoid nephrotoxins. Monitor BMP daily.


# Depression: Continue home meds


# DVT prophylaxis: Warfarin





A Yousef 


Hospitalist





Cassie EGAN, I+O


VSCassie I+O


Laboratory Tests


11/17/20 08:50











Vital Signs








  Date Time  Temp Pulse Resp B/P (MAP) Pulse Ox O2 Delivery O2 Flow Rate FiO2


 


11/17/20 12:00 98.9 74 18 138/70 (92) 98 Room Air  














I&O- Last 24 Hours up to 6 AM 


 


 11/17/20





 06:00


 


Intake Total 620 ml


 


Output Total 2775 ml


 


Balance -2155 ml

















RAUL MOREIRA MD              Nov 17, 2020 16:23

## 2020-11-17 NOTE — HPE
DATE OF ADMISSION: 2020



PRIMARY CARE PROVIDER: Ashwini Naylor MD. 



CHIEF COMPLAINT: Leg weakness, inability to ambulate, congestive heart failure 
with presumed preserved ejection fraction.



HISTORY OF PRESENT ILLNESS:  Radha Deleon is an 89-year-old patient of Dr. Ashwini Kerr. She has not been well since a few weeks ago. She had a fall in 
October, came to the emergency room, had a contusion to her left forehead, and 
was discharged home. She has noticed increasing lower extremity for the past two
weeks. She has been unable to walk because her legs felt weak. She looks to have
fractured her left great toe with that fall, and it is interfering with her 
ability to walk. She is being admitted for physical therapy, treatment of 
congestive heart failure, and further evaluation of her cardiac status. 



PAST MEDICAL HISTORY:  

1.  Chronic kidney disease stage 3. 

2.  Hypertensive heart disease.

3.  Atrial fibrillation with warfarin dose managed by [facility] 
[facility]/Dr. Medrano. 

4.  Tachybrady syndrome with a pacemaker in .

5.  History of dyspepsia and irritable bowel syndrome. 

6.  Hypothyroidism. 

7.  Osteopenia. 

8.  She has had various podiatric problems and saw Dr. Chinchilla for some 
bracing. 

9.  History of macular degeneration.

10.  She had a heart catheterization in . No significant coronary disease 
seen. 

11.  She had an echocardiogram in . Ejection fraction was 65%. Biatrial 
enlargement noted. 



PAST SURGICAL HISTORY: 

1.  Left elbow fracture .

2.  Bilateral cataract extractions.

3.  Pacemaker in .



FAMILY HISTORY: Mother  of uterine cancer. Three sisters and one brother are
.



SOCIAL HISTORY: Lives with her daughter. She does not smoke having quit many 
years ago. No alcohol intake. 



ALLERGIES:  Erythromycin caused vomiting and nausea. 



MEDICATIONS: 

* Multivitamin daily. 

* ICAPS daily.

* Red rice yeast extract. 

* Flaxseed.

* Nitrostat p.r.n. 

* Doxepin 10 mg 1-2 q.h.s. Monday, Wednesday, and Friday only. 

* Fluticasone nasal spray two sprays each nostril daily.

* Calcium with vitamin D.

* Magnesium 500 mg daily. 

* Losartan/hydrochlorothiazide 50/12.5 one daily.

* Digoxin 250 mcg Monday, Wednesday, and Friday only.

* Warfarin dose suggested by [facility]. 

* Furosemide 40 mg daily. 

* Potassium chloride 10 mEq b.i.d. 

* Atenolol 100 mg b.i.d. 

* Omeprazole 20 mg b.i.d. 

* Levothyroxine 137 mcg daily.

* Lomotil p.r.n. 

* Sertraline 100 mg daily. 



REVIEW OF SYSTEMS: No palpitations or syncope. She has had dyspnea on exertion. 
She denies any chest pain or pressure. She has had no epistaxis, rectal 
bleeding, urinary bleeding, fever, chills, or night sweats. 



PHYSICAL EXAMINATION: Vital signs per nursing flow sheet. BP as high as 190/89. 
Pulse is in the 90s. O2 saturation 97% on room air. General appearance: Elderly,
frail, resting in bed. She has ecchymosis over the left forehead. Neck: Supple. 
Lungs: Decreased breath sounds but clear. Heart: Regular rate and rhythm, 1/6 
systolic ejection murmur. Abdomen: Soft, nontender. No masses. Extremities: No 
clubbing or cyanosis. 1+ peripheral edema. Scattered ecchymoses bilaterally 
lower extremities, particularly the left lower extremity. She has swelling and 
ecchymosis of the left great toe. Moves arms and legs with equal strength. 



LABORATORY DATA: Digoxin level 0.4. Sodium 139, potassium 4.7, BUN 39, 
creatinine 1.4, glucose 106. BNP 3,015. Troponin undetectable. TSH normal. White
count 9.2, hemoglobin 10.3, platelets 220. GFR today is 37. Baseline GFR is 
around 45. Urinalysis showed red blood cells. Sodium 138, potassium 4.4. 



CT cervical spine unremarkable except for age-related changes. Chest x-ray: No 
active disease. X-ray of the left foot showed a nondisplaced intraarticular 
fracture base first proximal phalanx extending into the metatarsophalangeal 
joint. Head CT showed age-related changes. Knee x-ray unremarkable. 



IMPRESSION/PLAN:  

1.  Leg weakness probably from her congestive heart failure. Will order physical
therapy. She might need some rehab. 

2.  Congestive heart failure, presumed ejection fraction. Repeat her 
echocardiogram. We do not have access to [facility] studies due to 
an inability to access patient documents from the hospitals desktop units. 
Intravenous Lasix ordered to maintain diuresis of a liter per day. Daily lab 
work to follow renal function and potassium. 

3.  Fractured toe. Will ask Dr. Chinchilla to see her. 

4.  Atrial fibrillation. Rate is continued. Continue current regimen. Daily INR 
has been ordered. Continue her Warfarin therapy.

5.  Hypothyroidism. Continue dose of levothyroxine. 

6.  History of depression. Continue her sertraline and doxepin. 

MTDD

## 2020-11-18 VITALS — SYSTOLIC BLOOD PRESSURE: 155 MMHG | DIASTOLIC BLOOD PRESSURE: 61 MMHG

## 2020-11-18 VITALS — SYSTOLIC BLOOD PRESSURE: 105 MMHG | DIASTOLIC BLOOD PRESSURE: 55 MMHG

## 2020-11-18 VITALS — DIASTOLIC BLOOD PRESSURE: 49 MMHG | SYSTOLIC BLOOD PRESSURE: 101 MMHG

## 2020-11-18 VITALS — DIASTOLIC BLOOD PRESSURE: 72 MMHG | SYSTOLIC BLOOD PRESSURE: 148 MMHG

## 2020-11-18 VITALS — DIASTOLIC BLOOD PRESSURE: 62 MMHG | SYSTOLIC BLOOD PRESSURE: 122 MMHG

## 2020-11-18 VITALS — SYSTOLIC BLOOD PRESSURE: 102 MMHG | DIASTOLIC BLOOD PRESSURE: 58 MMHG

## 2020-11-18 LAB
BUN SERPL-MCNC: 38 MG/DL (ref 7–18)
CALCIUM SERPL-MCNC: 9 MG/DL (ref 8.8–10.2)
CHLORIDE SERPL-SCNC: 101 MEQ/L (ref 98–107)
CO2 SERPL-SCNC: 31 MEQ/L (ref 21–32)
CREAT SERPL-MCNC: 1.5 MG/DL (ref 0.55–1.3)
GFR SERPL CREATININE-BSD FRML MDRD: 34.8 ML/MIN/{1.73_M2} (ref 32–?)
GLUCOSE SERPL-MCNC: 126 MG/DL (ref 70–100)
HCT VFR BLD AUTO: 32.2 % (ref 36–47)
HGB BLD-MCNC: 10.5 G/DL (ref 12–15.5)
INR PPP: 2.2
MCH RBC QN AUTO: 31.3 PG (ref 27–33)
MCHC RBC AUTO-ENTMCNC: 32.6 G/DL (ref 32–36.5)
MCV RBC AUTO: 96.1 FL (ref 80–96)
PLATELET # BLD AUTO: 182 10^3/UL (ref 150–450)
POTASSIUM SERPL-SCNC: 3 MEQ/L (ref 3.5–5.1)
PROTHROMBIN TIME: 24.9 SECONDS (ref 12.5–14.3)
RBC # BLD AUTO: 3.35 10^6/UL (ref 4–5.4)
SODIUM SERPL-SCNC: 138 MEQ/L (ref 136–145)
WBC # BLD AUTO: 8.7 10^3/UL (ref 4–10)

## 2020-11-18 RX ADMIN — DOXEPIN HYDROCHLORIDE SCH MG: 10 CAPSULE ORAL at 20:53

## 2020-11-18 RX ADMIN — OMEPRAZOLE SCH MG: 20 CAPSULE, DELAYED RELEASE ORAL at 08:46

## 2020-11-18 RX ADMIN — SODIUM CHLORIDE, PRESERVATIVE FREE SCH ML: 5 INJECTION INTRAVENOUS at 20:54

## 2020-11-18 RX ADMIN — WARFARIN SODIUM SCH MG: 7.5 TABLET ORAL at 16:45

## 2020-11-18 RX ADMIN — FUROSEMIDE SCH MG: 10 INJECTION, SOLUTION INTRAMUSCULAR; INTRAVENOUS at 12:15

## 2020-11-18 RX ADMIN — POTASSIUM CHLORIDE SCH MLS/HR: 7.46 INJECTION, SOLUTION INTRAVENOUS at 17:17

## 2020-11-18 RX ADMIN — DIGOXIN SCH MG: 250 TABLET ORAL at 08:47

## 2020-11-18 RX ADMIN — POTASSIUM CHLORIDE SCH MLS/HR: 7.46 INJECTION, SOLUTION INTRAVENOUS at 20:53

## 2020-11-18 RX ADMIN — SERTRALINE HYDROCHLORIDE SCH MG: 100 TABLET ORAL at 08:47

## 2020-11-18 RX ADMIN — POTASSIUM CHLORIDE SCH MEQ: 750 TABLET, EXTENDED RELEASE ORAL at 08:48

## 2020-11-18 RX ADMIN — OMEPRAZOLE SCH MG: 20 CAPSULE, DELAYED RELEASE ORAL at 20:53

## 2020-11-18 RX ADMIN — LEVOTHYROXINE SODIUM SCH MCG: 137 TABLET ORAL at 05:21

## 2020-11-18 RX ADMIN — POTASSIUM CHLORIDE SCH MLS/HR: 7.46 INJECTION, SOLUTION INTRAVENOUS at 18:15

## 2020-11-18 RX ADMIN — POTASSIUM CHLORIDE SCH MLS/HR: 7.46 INJECTION, SOLUTION INTRAVENOUS at 19:24

## 2020-11-18 RX ADMIN — FUROSEMIDE SCH MG: 10 INJECTION, SOLUTION INTRAMUSCULAR; INTRAVENOUS at 00:09

## 2020-11-18 NOTE — IPNPDOC
Text Note


Date of Service


The patient was seen on 11/18/20.





NOTE


Subjective:


Patient was seen and examined this morning at bedside. She is able to respond 

appropriately to my questions. Nurse tells me there is no overnight events. 

Patient was seen by podiatry this morning who recommended a CAM boot. PT to 

evaluate patient later today 





Objective:


Constitutional: Awake and alert, in no apparent distress


ENT: Sclera are clear. Mucosa is moist. 


Respiratory:  Lungs CTA bilaterally.  No respiratory distress. No use of 

accessory muscles.


Cardiovascular: RRR S1 and S2 are normal, no murmur. no JVD. 


Gastrointestinal: Abdomen is soft, non distended, non tender, BS present. 


Musculoskeletal: 1+ pitting lower extremity edema. 


Neurologic: No focal neurological deficit.  


Mental Status: A&O x3, normal affect. 


Skin: left foot great toe is unchanged, echymotic and tender with a small 

blister. 





Assessment/plan:





# Closed fracture of left hallux and a closed fracture of the neck of the 5th 

metatarsal: Consulted podiatry . PT/OT. ARU screen. Podiatry 

recommends camboot therapy until fracture is stable which is around 4 weeks and 

ok with full weight bearing with cam boot in place and to follow up with him in 

clinic post discharge. 


# Congestive heart failure: She appears almost euvolemic to me with the 

exception of some mild +1 pitting edema. She denies any dyspnea. Lasix to PO. 


# A. fib: Rate controlled. On warfarin. INR therapeutic. Continue home dose 

warfarin. Continue atenolol for rate control


# Hypothyroidism: Continue home levothyroxine


# Hypertension: Continue atenolol. Monitor and titrate


# Tachybrady syndrome with a pacemaker in 2009.


# COPD stage III: Avoid nephrotoxins. Monitor BMP daily.


# Depression: Continue home meds


# DVT prophylaxis: Warfarin





A Yousef 


Hospitalist





VSCassie, I+O


VSCassie, I+O


Laboratory Tests


11/18/20 05:38











Vital Signs








  Date Time  Temp Pulse Resp B/P (MAP) Pulse Ox O2 Delivery O2 Flow Rate FiO2


 


11/18/20 16:00 98.2 79 18 101/49 (66) 97 Room Air  














I&O- Last 24 Hours up to 6 AM 


 


 11/18/20





 06:00


 


Intake Total 1320 ml


 


Output Total 2950 ml


 


Balance -1630 ml

















RAUL MOREIRA MD              Nov 18, 2020 20:10

## 2020-11-18 NOTE — CR
DATE OF CONSULTATION:  11/17/2020



CHIEF COMPLAINT:  Patient seen for evaluation of a fracture left foot.  The 
patient has extensive bruising on the top of her left foot and she is seen today
for evaluation. 



PHYSICAL EXAMINATION:  Physical exam reveals a blister present over the left 
foot. There are no signs of infection. This is over the area of the dorsal 
hallux. The bruising extends all the way across the foot. 



Her x-rays were reviewed revealing an interarticular fracture of the proximal 
phalanx of the hallus on the left foot, it is nondisplaced. There is also a 
transverse fracture through the 5th metatarsal head which is nondisplaced. 



PAST MEDICAL HISTORY:  Chronic kidney disease Stage III, hypertensive heart 
disease, atrial fibrillation, irritable bowel syndrome, hypothyroidism, 
osteoporosis, macular degeneration.



PAST SURGICAL HISTORY:  Left elbow fracture, bilateral cataract extractions and 
insertion of pacemaker. 



MEDICATIONS:  Multivitamin daily, Nitrostat, Doxepin, Fluconazole nasal spray, 
magnesium, Losartan, Digoxin, Warfarin, furosemide, potassium chloride, 
atenolol, omeprazole, levothyroxine, Lomotil and sertraline. 



IMPRESSION:  Closed fracture of left hallux and a closed fracture of the neck of
the 5th metatarsal.



PLAN: Since the patient has an unstable metatarsal fracture, would recommend Cam
boot therapy until the fracture is stable, generally this is four weeks. With 
the Cam boot in place, the patient can ambulate. The patient is able to get her 
foot wet. She can be seen post discharge at my office for follow-up examination.




Thank you for this consult. 

RONNY

## 2020-11-19 VITALS — SYSTOLIC BLOOD PRESSURE: 143 MMHG | DIASTOLIC BLOOD PRESSURE: 65 MMHG

## 2020-11-19 VITALS — DIASTOLIC BLOOD PRESSURE: 71 MMHG | SYSTOLIC BLOOD PRESSURE: 140 MMHG

## 2020-11-19 VITALS — DIASTOLIC BLOOD PRESSURE: 55 MMHG | SYSTOLIC BLOOD PRESSURE: 113 MMHG

## 2020-11-19 VITALS — DIASTOLIC BLOOD PRESSURE: 68 MMHG | SYSTOLIC BLOOD PRESSURE: 118 MMHG

## 2020-11-19 VITALS — DIASTOLIC BLOOD PRESSURE: 63 MMHG | SYSTOLIC BLOOD PRESSURE: 108 MMHG

## 2020-11-19 VITALS — SYSTOLIC BLOOD PRESSURE: 106 MMHG | DIASTOLIC BLOOD PRESSURE: 53 MMHG

## 2020-11-19 LAB
BUN SERPL-MCNC: 46 MG/DL (ref 7–18)
CALCIUM SERPL-MCNC: 9.4 MG/DL (ref 8.8–10.2)
CHLORIDE SERPL-SCNC: 106 MEQ/L (ref 98–107)
CO2 SERPL-SCNC: 27 MEQ/L (ref 21–32)
CREAT SERPL-MCNC: 1.46 MG/DL (ref 0.55–1.3)
GFR SERPL CREATININE-BSD FRML MDRD: 35.9 ML/MIN/{1.73_M2} (ref 32–?)
GLUCOSE SERPL-MCNC: 157 MG/DL (ref 70–100)
HCT VFR BLD AUTO: 34.3 % (ref 36–47)
HGB BLD-MCNC: 10.9 G/DL (ref 12–15.5)
INR PPP: 1.96
MCH RBC QN AUTO: 31.2 PG (ref 27–33)
MCHC RBC AUTO-ENTMCNC: 31.8 G/DL (ref 32–36.5)
MCV RBC AUTO: 98.3 FL (ref 80–96)
PLATELET # BLD AUTO: 197 10^3/UL (ref 150–450)
POTASSIUM SERPL-SCNC: 4.3 MEQ/L (ref 3.5–5.1)
PROTHROMBIN TIME: 22.8 SECONDS (ref 12.5–14.3)
RBC # BLD AUTO: 3.49 10^6/UL (ref 4–5.4)
SODIUM SERPL-SCNC: 139 MEQ/L (ref 136–145)
WBC # BLD AUTO: 7.9 10^3/UL (ref 4–10)

## 2020-11-19 RX ADMIN — OMEPRAZOLE SCH MG: 20 CAPSULE, DELAYED RELEASE ORAL at 21:52

## 2020-11-19 RX ADMIN — POTASSIUM CHLORIDE SCH MEQ: 750 TABLET, EXTENDED RELEASE ORAL at 09:50

## 2020-11-19 RX ADMIN — DOXEPIN HYDROCHLORIDE SCH MG: 10 CAPSULE ORAL at 21:52

## 2020-11-19 RX ADMIN — LEVOTHYROXINE SODIUM SCH MCG: 137 TABLET ORAL at 05:25

## 2020-11-19 RX ADMIN — SODIUM CHLORIDE, PRESERVATIVE FREE SCH ML: 5 INJECTION INTRAVENOUS at 05:29

## 2020-11-19 RX ADMIN — DIGOXIN SCH MG: 250 TABLET ORAL at 09:50

## 2020-11-19 RX ADMIN — FUROSEMIDE SCH MG: 40 TABLET ORAL at 09:51

## 2020-11-19 RX ADMIN — OMEPRAZOLE SCH MG: 20 CAPSULE, DELAYED RELEASE ORAL at 09:51

## 2020-11-19 RX ADMIN — SODIUM CHLORIDE, PRESERVATIVE FREE SCH ML: 5 INJECTION INTRAVENOUS at 13:03

## 2020-11-19 RX ADMIN — SERTRALINE HYDROCHLORIDE SCH MG: 100 TABLET ORAL at 09:51

## 2020-11-19 RX ADMIN — SODIUM CHLORIDE, PRESERVATIVE FREE SCH ML: 5 INJECTION INTRAVENOUS at 21:53

## 2020-11-19 RX ADMIN — WARFARIN SODIUM SCH MG: 7.5 TABLET ORAL at 16:27

## 2020-11-19 NOTE — IPNPDOC
Text Note


Date of Service


The patient was seen on 11/19/20.





NOTE


Subjective:


Patient was seen and examined this morning at bedside. She is able to respond 

appropriately to my questions but is sleepy today. Nurse tells me there is no 

overnight events. Patient was seen by podiatry yesterday who recommended a CAM 

boot and follow up in the clinic. 





Objective:


Constitutional: Awake and alert, in no apparent distress


ENT: Sclera are clear. Mucosa is moist. 


Respiratory:  Lungs CTA bilaterally.  No respiratory distress. No use of 

accessory muscles.


Cardiovascular: RRR S1 and S2 are normal, no murmur. no JVD. 


Gastrointestinal: Abdomen is soft, non distended, non tender, BS present. 


Musculoskeletal: 1+ pitting lower extremity edema. 


Neurologic: No focal neurological deficit.  


Mental Status: A&O x3, normal affect. 


Skin: left foot great toe is unchanged, echymotic and tender with a small 

blister. 





Assessment/plan:





89-year-old female admitted for left foot pain and tripping at home found to 

have a left foot closed fracture of left helix and a closed fracture of the neck

of the fifth metatarsal. Podiatry was consulted who recommended cam boot until 

fracture is more stable for about 4 weeks in follow-up in the clinic. Patient is

medically cleared for discharge, would benefit from subacute rehabilitation from

physical therapy recommendations.





# Closed fracture of left hallux and a closed fracture of the neck of the 5th 

metatarsal: Consulted podiatry . Podiatry recommends cam boot 

therapy until fracture is stable which is around 4 weeks and ok with full weight

bearing with cam boot in place and to follow up with him in clinic post 

discharge. PT recommending subacute rehab.


# Congestive heart failure: She appears euvolemic. She denies any dyspnea. Lasix

to PO. 


# A. fib: Rate controlled. On warfarin. INR therapeutic. Continue home dose 

warfarin. Continue atenolol for rate control


# Hypothyroidism: Continue home levothyroxine


# Hypertension: Continue atenolol. Monitor and titrate


# Tachybrady syndrome with a pacemaker in 2009.


# COPD stage III: Avoid nephrotoxins. Monitor BMP daily.


# Depression: Continue home meds


# DVT prophylaxis: Warfarin





A Yousef 


Hospitalist





VS,Cassie, I+O


VS, Cassie, I+O


Laboratory Tests


11/19/20 08:18











Vital Signs








  Date Time  Temp Pulse Resp B/P (MAP) Pulse Ox O2 Delivery O2 Flow Rate FiO2


 


11/19/20 09:51  65  113/55    


 


11/19/20 08:00 96.9  18  95 Room Air  














I&O- Last 24 Hours up to 6 AM 


 


 11/19/20





 06:00


 


Intake Total 940 ml


 


Output Total 1400 ml


 


Balance -460 ml

















RAUL MOREIRA MD              Nov 19, 2020 11:38

## 2020-11-19 NOTE — ECHO
DATE OF PROCEDURE: 11/17/2020 



Age: 89 

Gender: Female 

Height: 66 inches 

Weight: 169 pounds 

Body surface area: 1.87 m2  



PATIENT LOCATION: [facility]/progressive care unit (PCU) Room 3228. 



REFERRING PHYSICIAN: Sundar Quintana MD.  



INDICATION: Heart failure.  



MEASUREMENTS: 

2D Measurements:   

      RV  5.2 cm 

               LV  4.8 cm 

      Septum 1.0 cm 

      Posterior wall 1.0 cm 

      Aortic Root 3.3 cm

      LA  5.6 cm 

      LVEF 65%



Doppler Measurements:

      AV  1.45 msec

      LVOT  0.9 msec

      MV-E 85

      E prime medial 8.3, A prime lateral 15.6  

      Average E/E prime ratio 7.1

      PCWP  10.7 mmHg 

      PV  0.6 msec

      Pulmonary artery acceleration time 69 msec

      RVSP 55-60 mmHg

      IVC  2.5 cm 

 

COMMENTS: 

Underlying atrial fibrillation with controlled ventricular response. No 
intraventricular conduction disturbance. Appropriate VVI paced complexes 
infrequently.



M-mode and two-dimensional echocardiography was performed with pulse, continuous
wave, color flow, and tissue Doppler studies.  

 

Normal left ventricular size, wall thickness, and wall motion. 



Prominently dilated left atrium with current estimated mean left atrial pressure
within normal limits. 



Prominently dilated right heart chambers with hypokinesis of the right 
ventricular free wall and at least moderately severe pulmonary hypertension.  



Moderately dilated inferior vena cava (IVC) with slightly reduced respiratory 
collapse in keeping with an elevated central venous pressure/right heart 
failure. 



Normal aortic dimensions. 



Mild aortic valvular sclerosis without stenosis, but mild insufficiency.



Mild degenerative changes of the mitral valvular apparatus without inflow tract 
obstruction, but mild-moderate insufficiency. 



Normal appearing tricuspid valve with moderately severe to severe tricuspid 
insufficiency.



Pacing lead could be visualized traversing right heart structures.



No apparent intracardiac mass beyond that. No pericardial effusion. 

MTDD

## 2020-11-20 VITALS — SYSTOLIC BLOOD PRESSURE: 133 MMHG | DIASTOLIC BLOOD PRESSURE: 70 MMHG

## 2020-11-20 LAB
BUN SERPL-MCNC: 52 MG/DL (ref 7–18)
CALCIUM SERPL-MCNC: 8.7 MG/DL (ref 8.8–10.2)
CHLORIDE SERPL-SCNC: 108 MEQ/L (ref 98–107)
CO2 SERPL-SCNC: 25 MEQ/L (ref 21–32)
CREAT SERPL-MCNC: 1.3 MG/DL (ref 0.55–1.3)
GFR SERPL CREATININE-BSD FRML MDRD: 41.1 ML/MIN/{1.73_M2} (ref 32–?)
GLUCOSE SERPL-MCNC: 95 MG/DL (ref 70–100)
HCT VFR BLD AUTO: 30.6 % (ref 36–47)
HGB BLD-MCNC: 9.8 G/DL (ref 12–15.5)
INR PPP: 1.92
MCH RBC QN AUTO: 30.8 PG (ref 27–33)
MCHC RBC AUTO-ENTMCNC: 32 G/DL (ref 32–36.5)
MCV RBC AUTO: 96.2 FL (ref 80–96)
PLATELET # BLD AUTO: 196 10^3/UL (ref 150–450)
POTASSIUM SERPL-SCNC: 4.1 MEQ/L (ref 3.5–5.1)
PROTHROMBIN TIME: 22.4 SECONDS (ref 12.5–14.3)
RBC # BLD AUTO: 3.18 10^6/UL (ref 4–5.4)
SODIUM SERPL-SCNC: 141 MEQ/L (ref 136–145)
WBC # BLD AUTO: 6.5 10^3/UL (ref 4–10)

## 2020-11-20 RX ADMIN — SODIUM CHLORIDE, PRESERVATIVE FREE SCH ML: 5 INJECTION INTRAVENOUS at 06:11

## 2020-11-20 RX ADMIN — DIGOXIN SCH MG: 250 TABLET ORAL at 08:55

## 2020-11-20 RX ADMIN — SERTRALINE HYDROCHLORIDE SCH MG: 100 TABLET ORAL at 08:56

## 2020-11-20 RX ADMIN — POTASSIUM CHLORIDE SCH MEQ: 750 TABLET, EXTENDED RELEASE ORAL at 08:55

## 2020-11-20 RX ADMIN — OMEPRAZOLE SCH MG: 20 CAPSULE, DELAYED RELEASE ORAL at 08:54

## 2020-11-20 RX ADMIN — FUROSEMIDE SCH MG: 40 TABLET ORAL at 08:56

## 2020-11-20 RX ADMIN — LEVOTHYROXINE SODIUM SCH MCG: 137 TABLET ORAL at 05:22

## 2020-11-20 NOTE — DS.PDOC
Discharge Summary


General


Date of Admission


Nov 16, 2020 at 14:11


Date of Discharge


11/20/2020





Discharge Summary


PROCEDURES PERFORMED DURING STAY: [None].





ADMITTING DIAGNOSES: 


1. leg weakness 





DISCHARGE DIAGNOSES:


1. Closed fracture of left hallux and a closed fracture of the neck of the 5th 

metatarsal





COMPLICATIONS/CHIEF COMPLAINT: Leg Weakness Bilateral.





HISTORY OF PRESENT ILLNESS: from admitting H&P: Radha Deleon is an 89-year-old 

patient of Dr. Chou. She has not been well since a few weeks ago. She 

had a fall in October, came to the emergency room, had a contusion to her left 

forehead, and was discharged home. She has noticed increasing lower extremity 

for the past twoweeks. She has been unable to walk because her legs felt weak. 

She looks to havefractured her left great toe with that fall, and it is 

interfering with her ability to walk. She is being admitted for physical the

rapy, treatment of congestive heart failure, and further evaluation of her 

cardiac status. 





HOSPITAL COURSE: 89-year-old female admitted for left foot pain and tripping at 

home found to have a left foot closed fracture of left helix and a closed 

fracture of the neck of the fifth metatarsal. Podiatry was consulted who 

recommended cam boot until fracture is more stable for about 4 weeks in follow-u

p in the clinic. Patient is medically cleared for discharge, would benefit from 

subacute rehabilitation from physical therapy recommendations. Patient was 

discharge on 11/20/2020 to keep home for subacute rehabilitation. 





# Closed fracture of left hallux and a closed fracture of the neck of the 5th 

metatarsal: Consulted podiatry . Podiatry recommends cam boot 

therapy until fracture is stable which is around 4 weeks and ok with full weight

 bearing with cam boot in place and to follow up with him in clinic post 

discharge. PT recommending subacute rehab.


# Congestive heart failure: Euvolemic. She denies any dyspnea. Lasix to PO. 


# A. fib: Rate controlled. On warfarin. INR therapeutic. Continue home dose 

warfarin. Continue atenolol for rate control


# Hypothyroidism: Continue home levothyroxine


# Hypertension: Continue atenolol. Monitor and titrate


# Tachybrady syndrome with a pacemaker in 2009.


# COPD stage III: Avoid nephrotoxins. Monitor BMP daily.


# Depression: Continue home meds





DISCHARGE MEDICATIONS: Please see below.


 


ALLERGIES: Please see below.





PHYSICAL EXAMINATION ON DISCHARGE:


VITAL SIGNS: Please see below.


Constitutional: Awake and alert, in no apparent distress


ENT: Sclera are clear. Mucosa is moist. 


Respiratory:  Lungs CTA bilaterally.  No respiratory distress. No use of 

accessory muscles.


Cardiovascular: RRR S1 and S2 are normal, no murmur. no JVD. 


Gastrointestinal: Abdomen is soft, non distended, non tender, BS present. 


Musculoskeletal: 1+ pitting lower extremity edema. 


Neurologic: No focal neurological deficit.  


Mental Status: A&O x3, normal affect. 


Skin: left foot great toe is unchanged, echymotic and tender with a small 

blister. CAM boot 





LABORATORY DATA: Please see below.





IMAGING:





Foot XR: Nondisplaced intra-articular fracture base of 1st proximal phalanx 

medially, extending


into the metatarsophalangeal joint.  Old healed fractures distal 3rd through 5th

 metatarsals.





CT cervical spine, CXR, Heat CT, knee Xray.





PROGNOSIS:fair 





ACTIVITY: [As tolerated].





DIET: 2 g sodium diet





DISCHARGE PLAN: subacute rehab then home 





DISPOSITION: subacute rehab 





DISCHARGE INSTRUCTIONS:


Please follow up with your primary care physician within 1 week from discharge. 


If you do not have one, please follow up with us to schedule an appointment.


Please keep all of your follow up appointments. Please call central to book your

appointments with hospital specialists.


Please take all your medications as prescribed. 


Please call/come to Clinic or go to the Emergency Department if - Temp >101, 

intractable Nausea/Vomiting, Diarrhea, Mouth sores, Headaches, Altered mental 

status, Seizures, sudden onset of swelling, bleeding, shortness of breath or 

chest pain.





ITEMS TO FOLLOWUP ON ON OUTPATIENT:


1. Follow-up with PCP within 2-3 days of discharge





DISCHARGE CONDITION: [Stable].





TIME SPENT ON DISCHARGE: Greater than 35 minutes.





Vital Signs/I&Os





Vital Signs








  Date Time  Temp Pulse Resp B/P (MAP) Pulse Ox O2 Delivery O2 Flow Rate FiO2


 


11/20/20 08:55  65      


 


11/20/20 08:55    133/70    


 


11/20/20 08:00 97.9  18  99 Room Air  














I&O- Last 24 Hours up to 6 AM 


 


 11/20/20





 06:00


 


Intake Total 590 ml


 


Output Total 725 ml


 


Balance -135 ml











Laboratory Data


Labs 24H


Laboratory Tests 2


11/20/20 07:12: 


Nucleated Red Blood Cells % (auto) 0.0, Prothrombin Time 22.4H, Prothromb Time 

International Ratio 1.92, Anion Gap 8, Glomerular Filtration Rate 41.1, Calcium 

Level 8.7L


CBC/BMP


Laboratory Tests


11/20/20 07:12











Discharge Medications


Scheduled


Antiox.mv No.10/Omeg3s/Lut/Nano (I-Caps with Lutein-Omega 3 Sfg) 1 Each Capsule, 

2 CAP PO BID, (Reported)


Atenolol (Atenolol) 100 Mg Tab, 100 MG PO BID, (Reported)


Calcium Carbonate (Calcium) 600 Mg Tablet, 1,200 MG PO DAILY, (Reported)


Digoxin (Digitek) 250 Mcg Tablet, 250 MCG PO DAILY, (Reported)


Doxepin HCl (Doxepin HCl) 10 Mg Capsule, 10 MG PO QHS, (Reported)


Flaxseed Oil (Flaxseed Oil) 1,000 Mg Capsule, 1 CAP PO DAILY, (Reported)


Furosemide (Lasix) 40 Mg Tab, 40 MG PO DAILY, (Reported)


Levothyroxine Sodium (Levothyroxine Sodium) 137 Mcg Tab, 137 MCG PO DAILY, 

(Reported)


Magnesium Oxide (Magnesium Oxide) 500 Mg Tablet, 500 MG PO BID, (Reported)


Multivitamin (Multivitamins) 1 Each Capsule, 1 CAP PO DAILY, (Reported)


Omeprazole (Omeprazole) 20 Mg Cap, 20 MG PO BID, (Reported)


Potassium Chloride (Potassium Chloride) 10 Meq Tab, 20 MEQ PO DAILY, (Reported)


Red Yeast Rice (Red Yeast Rice) 600 Mg Capsule, 600 MG PO DAILY, (Reported)


Sertraline HCl (Sertraline HCl) 100 Mg Tablet, 100 MG PO DAILY, (Reported)


Ubidecarenone/Vit E Acet (Co Q-10 100 mg Softgel) 1 Each Capsule, 100 MG PO 

DAILY, (Reported)


Vit A and D3 in Cod Liver Oil (Cod Liver Oil Softgel) 1 Each Capsule, 1 CAP PO 

DAILY, (Reported)


Warfarin Sodium (Warfarin Sodium) 5 Mg Tablet, 5 MG PO 4XWK, (Reported)


   MON, WED, FRI, SUN 


Warfarin Sodium (Warfarin Sodium) 5 Mg Tablet, 2.5 MG PO 3XW, (Reported)


   TUES, THURS, SUN 





Scheduled PRN


Acetaminophen (Acetaminophen) 325 Mg Tablet, 650 MG PO TID PRN for PAIN, 

(Reported)


Diphenoxylate HCl/Atropine (Lomotil 2.5-0.025 mg Tablet) 1 Each Tablet, 1 TAB PO

 TID PRN for DIARRHEA, (Reported)


Fluticasone Propionate (Fluticasone Propionate) 16 Gm Spray.susp, 2 SPRAY NARES 

DAILY PRN for CONGESTION, (Reported)





Allergies


Coded Allergies:  


     erythromycin base (Verified  Allergy, Intermediate, violently ill, 2/25/20)











RAUL MOREIRA MD              Nov 20, 2020 11:31

## 2020-11-25 ENCOUNTER — HOSPITAL ENCOUNTER (OUTPATIENT)
Dept: HOSPITAL 53 - SSVSNF3 | Age: 85
End: 2020-11-25
Attending: INTERNAL MEDICINE

## 2020-11-25 DIAGNOSIS — I50.9: Primary | ICD-10-CM

## 2020-11-25 DIAGNOSIS — Z20.828: Primary | ICD-10-CM

## 2020-11-25 LAB
BUN SERPL-MCNC: 35 MG/DL (ref 7–18)
CALCIUM SERPL-MCNC: 8.8 MG/DL (ref 8.8–10.2)
CHLORIDE SERPL-SCNC: 107 MEQ/L (ref 98–107)
CO2 SERPL-SCNC: 28 MEQ/L (ref 21–32)
CREAT SERPL-MCNC: 1.16 MG/DL (ref 0.55–1.3)
GFR SERPL CREATININE-BSD FRML MDRD: 46.8 ML/MIN/{1.73_M2} (ref 32–?)
GLUCOSE SERPL-MCNC: 130 MG/DL (ref 70–100)
HCT VFR BLD AUTO: 32.2 % (ref 36–47)
HGB BLD-MCNC: 10.3 G/DL (ref 12–15.5)
INR PPP: 1.88
MCH RBC QN AUTO: 31.4 PG (ref 27–33)
MCHC RBC AUTO-ENTMCNC: 32 G/DL (ref 32–36.5)
MCV RBC AUTO: 98.2 FL (ref 80–96)
PLATELET # BLD AUTO: 260 10^3/UL (ref 150–450)
POTASSIUM SERPL-SCNC: 4.4 MEQ/L (ref 3.5–5.1)
PROTHROMBIN TIME: 22 SECONDS (ref 12.5–14.3)
RBC # BLD AUTO: 3.28 10^6/UL (ref 4–5.4)
SODIUM SERPL-SCNC: 141 MEQ/L (ref 136–145)
TSH SERPL DL<=0.005 MIU/L-ACNC: 2.65 UIU/ML (ref 0.36–3.74)
WBC # BLD AUTO: 5.3 10^3/UL (ref 4–10)

## 2020-12-02 ENCOUNTER — HOSPITAL ENCOUNTER (OUTPATIENT)
Dept: HOSPITAL 53 - SSVSNF3 | Age: 85
End: 2020-12-02
Attending: INTERNAL MEDICINE

## 2020-12-02 DIAGNOSIS — I50.9: Primary | ICD-10-CM

## 2020-12-02 LAB
BUN SERPL-MCNC: 26 MG/DL (ref 7–18)
CALCIUM SERPL-MCNC: 8.9 MG/DL (ref 8.8–10.2)
CHLORIDE SERPL-SCNC: 105 MEQ/L (ref 98–107)
CO2 SERPL-SCNC: 26 MEQ/L (ref 21–32)
CREAT SERPL-MCNC: 1.1 MG/DL (ref 0.55–1.3)
GFR SERPL CREATININE-BSD FRML MDRD: 49.8 ML/MIN/{1.73_M2} (ref 32–?)
GLUCOSE SERPL-MCNC: 96 MG/DL (ref 70–100)
HCT VFR BLD AUTO: 31.9 % (ref 36–47)
HGB BLD-MCNC: 10.4 G/DL (ref 12–15.5)
INR PPP: 1.84
MCH RBC QN AUTO: 32.1 PG (ref 27–33)
MCHC RBC AUTO-ENTMCNC: 32.6 G/DL (ref 32–36.5)
MCV RBC AUTO: 98.5 FL (ref 80–96)
PLATELET # BLD AUTO: 282 10^3/UL (ref 150–450)
POTASSIUM SERPL-SCNC: 4.1 MEQ/L (ref 3.5–5.1)
PROTHROMBIN TIME: 21.7 SECONDS (ref 12.5–14.3)
RBC # BLD AUTO: 3.24 10^6/UL (ref 4–5.4)
SODIUM SERPL-SCNC: 138 MEQ/L (ref 136–145)
WBC # BLD AUTO: 7.3 10^3/UL (ref 4–10)

## 2020-12-07 ENCOUNTER — HOSPITAL ENCOUNTER (OUTPATIENT)
Dept: HOSPITAL 53 - SSVSNF3 | Age: 85
End: 2020-12-07
Attending: INTERNAL MEDICINE

## 2020-12-07 DIAGNOSIS — I51.7: ICD-10-CM

## 2020-12-07 DIAGNOSIS — Z20.828: ICD-10-CM

## 2020-12-07 DIAGNOSIS — I48.91: Primary | ICD-10-CM

## 2020-12-07 DIAGNOSIS — Z95.0: ICD-10-CM

## 2020-12-07 LAB
BUN SERPL-MCNC: 32 MG/DL (ref 7–18)
CALCIUM SERPL-MCNC: 8.9 MG/DL (ref 8.8–10.2)
CHLORIDE SERPL-SCNC: 106 MEQ/L (ref 98–107)
CO2 SERPL-SCNC: 28 MEQ/L (ref 21–32)
CREAT SERPL-MCNC: 0.98 MG/DL (ref 0.55–1.3)
GFR SERPL CREATININE-BSD FRML MDRD: 56.9 ML/MIN/{1.73_M2} (ref 32–?)
GLUCOSE SERPL-MCNC: 121 MG/DL (ref 70–100)
HCT VFR BLD AUTO: 31.1 % (ref 36–47)
HGB BLD-MCNC: 9.7 G/DL (ref 12–15.5)
INR PPP: 1.84
MCH RBC QN AUTO: 30.9 PG (ref 27–33)
MCHC RBC AUTO-ENTMCNC: 31.2 G/DL (ref 32–36.5)
MCV RBC AUTO: 99 FL (ref 80–96)
PLATELET # BLD AUTO: 246 10^3/UL (ref 150–450)
POTASSIUM SERPL-SCNC: 3.9 MEQ/L (ref 3.5–5.1)
PROTHROMBIN TIME: 21.7 SECONDS (ref 12.5–14.3)
RBC # BLD AUTO: 3.14 10^6/UL (ref 4–5.4)
SODIUM SERPL-SCNC: 139 MEQ/L (ref 136–145)
WBC # BLD AUTO: 6.9 10^3/UL (ref 4–10)

## 2020-12-07 NOTE — REPPI
INDICATION:

317-2 SOB.



COMPARISON:

Comparison chest x-ray November 16, 2020..



TECHNIQUE:

Semi-erect AP portable chest x-ray.



FINDINGS:

A bipolar pacemaker is again seen in the enlarged heart view of the left side.  Heart

size unchanged.  Pulmonary vasculature is cephalized.  No infiltrate is seen.  Pleural

angles are sharp.  There is no evidence of pulmonary edema.  There are advanced

arthropathy changes in the shoulders bilaterally.  The aorta is calcific.



IMPRESSION:

Cardiomegaly with pacemaker.  Otherwise no acute disease.





<Electronically signed by Harris Fisher > 12/07/20 [id number]

## 2020-12-09 ENCOUNTER — HOSPITAL ENCOUNTER (OUTPATIENT)
Dept: HOSPITAL 53 - SSVSNF3 | Age: 85
End: 2020-12-09
Attending: INTERNAL MEDICINE
Payer: MEDICARE

## 2020-12-09 DIAGNOSIS — I50.9: Primary | ICD-10-CM

## 2020-12-09 LAB
BUN SERPL-MCNC: 33 MG/DL (ref 7–18)
CALCIUM SERPL-MCNC: 8.2 MG/DL (ref 8.8–10.2)
CHLORIDE SERPL-SCNC: 107 MEQ/L (ref 98–107)
CO2 SERPL-SCNC: 28 MEQ/L (ref 21–32)
CREAT SERPL-MCNC: 1.28 MG/DL (ref 0.55–1.3)
DIGOXIN SERPL-MCNC: 1.5 NG/ML (ref 0.5–2)
GFR SERPL CREATININE-BSD FRML MDRD: 41.8 ML/MIN/{1.73_M2} (ref 32–?)
GLUCOSE SERPL-MCNC: 160 MG/DL (ref 70–100)
HCT VFR BLD AUTO: 30.8 % (ref 36–47)
HGB BLD-MCNC: 9.6 G/DL (ref 12–15.5)
INR PPP: 1.98
MAGNESIUM SERPL-MCNC: 2.4 MG/DL (ref 1.8–2.4)
MCH RBC QN AUTO: 31.1 PG (ref 27–33)
MCHC RBC AUTO-ENTMCNC: 31.2 G/DL (ref 32–36.5)
MCV RBC AUTO: 99.7 FL (ref 80–96)
PLATELET # BLD AUTO: 223 10^3/UL (ref 150–450)
POTASSIUM SERPL-SCNC: 3.8 MEQ/L (ref 3.5–5.1)
PROTHROMBIN TIME: 22.9 SECONDS (ref 12.5–14.3)
RBC # BLD AUTO: 3.09 10^6/UL (ref 4–5.4)
SODIUM SERPL-SCNC: 143 MEQ/L (ref 136–145)
WBC # BLD AUTO: 4.4 10^3/UL (ref 4–10)

## 2020-12-13 ENCOUNTER — HOSPITAL ENCOUNTER (OUTPATIENT)
Dept: HOSPITAL 53 - SSVSNF3 | Age: 85
End: 2020-12-13
Attending: INTERNAL MEDICINE

## 2020-12-13 DIAGNOSIS — Z20.828: Primary | ICD-10-CM

## 2020-12-15 ENCOUNTER — HOSPITAL ENCOUNTER (OUTPATIENT)
Dept: HOSPITAL 53 - SSVSNF3 | Age: 85
End: 2020-12-15
Attending: INTERNAL MEDICINE
Payer: MEDICARE

## 2020-12-15 DIAGNOSIS — S92.415A: Primary | ICD-10-CM
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
## 2020-12-15 NOTE — REPPI
INDICATION:

FX  317-2



COMPARISON:

None.



TECHNIQUE:

Two views performed portably.



FINDINGS:

I suspect a faintly visible nondisplaced intra-articular fracture at the base of the

great toe proximal phalange medially.

Demineralization.

No calcification or foreign body.



IMPRESSION:

Faintly visible nondisplaced intra-articular fracture at the base of the great toe

proximal phalange.





<Electronically signed by Rodríguez Mg > 12/15/20 [id number]

## 2020-12-16 ENCOUNTER — HOSPITAL ENCOUNTER (OUTPATIENT)
Dept: HOSPITAL 53 - SSVSNF3 | Age: 85
End: 2020-12-16
Attending: INTERNAL MEDICINE

## 2020-12-16 DIAGNOSIS — I50.9: Primary | ICD-10-CM

## 2020-12-16 LAB
BUN SERPL-MCNC: 30 MG/DL (ref 7–18)
CALCIUM SERPL-MCNC: 8.8 MG/DL (ref 8.8–10.2)
CHLORIDE SERPL-SCNC: 105 MEQ/L (ref 98–107)
CO2 SERPL-SCNC: 30 MEQ/L (ref 21–32)
CREAT SERPL-MCNC: 1.13 MG/DL (ref 0.55–1.3)
GFR SERPL CREATININE-BSD FRML MDRD: 48.3 ML/MIN/{1.73_M2} (ref 32–?)
GLUCOSE SERPL-MCNC: 106 MG/DL (ref 70–100)
HCT VFR BLD AUTO: 34.2 % (ref 36–47)
HGB BLD-MCNC: 10.6 G/DL (ref 12–15.5)
INR PPP: 2.2
MAGNESIUM SERPL-MCNC: 2.5 MG/DL (ref 1.8–2.4)
MCH RBC QN AUTO: 30.9 PG (ref 27–33)
MCHC RBC AUTO-ENTMCNC: 31 G/DL (ref 32–36.5)
MCV RBC AUTO: 99.7 FL (ref 80–96)
PLATELET # BLD AUTO: 240 10^3/UL (ref 150–450)
POTASSIUM SERPL-SCNC: 4.3 MEQ/L (ref 3.5–5.1)
PROTHROMBIN TIME: 24.9 SECONDS (ref 12.5–14.3)
RBC # BLD AUTO: 3.43 10^6/UL (ref 4–5.4)
SODIUM SERPL-SCNC: 142 MEQ/L (ref 136–145)
WBC # BLD AUTO: 5.7 10^3/UL (ref 4–10)

## 2020-12-17 ENCOUNTER — HOSPITAL ENCOUNTER (OUTPATIENT)
Dept: HOSPITAL 53 - SSVSNF3 | Age: 85
End: 2020-12-17
Attending: INTERNAL MEDICINE
Payer: MEDICARE

## 2020-12-17 DIAGNOSIS — Z20.828: Primary | ICD-10-CM

## 2020-12-23 ENCOUNTER — HOSPITAL ENCOUNTER (OUTPATIENT)
Dept: HOSPITAL 53 - SSVSNF3 | Age: 85
End: 2020-12-23
Attending: INTERNAL MEDICINE
Payer: MEDICARE

## 2020-12-23 DIAGNOSIS — Z20.828: Primary | ICD-10-CM

## 2020-12-23 DIAGNOSIS — Z79.01: ICD-10-CM

## 2020-12-23 LAB
INR PPP: 2.4
PROTHROMBIN TIME: 26.7 SECONDS (ref 12.5–14.3)

## 2020-12-23 PROCEDURE — 36415 COLL VENOUS BLD VENIPUNCTURE: CPT

## 2020-12-23 PROCEDURE — 85610 PROTHROMBIN TIME: CPT

## 2020-12-29 ENCOUNTER — HOSPITAL ENCOUNTER (OUTPATIENT)
Dept: HOSPITAL 53 - SSVSNF3 | Age: 85
End: 2020-12-29
Attending: INTERNAL MEDICINE
Payer: MEDICARE

## 2020-12-29 DIAGNOSIS — Z20.828: Primary | ICD-10-CM

## 2020-12-30 ENCOUNTER — HOSPITAL ENCOUNTER (OUTPATIENT)
Dept: HOSPITAL 53 - SSVSNF3 | Age: 85
End: 2020-12-30
Attending: PHYSICIAN ASSISTANT
Payer: MEDICAID

## 2020-12-30 DIAGNOSIS — Z79.01: ICD-10-CM

## 2020-12-30 DIAGNOSIS — I48.91: Primary | ICD-10-CM

## 2020-12-30 LAB
BUN SERPL-MCNC: 34 MG/DL (ref 7–18)
CALCIUM SERPL-MCNC: 8.8 MG/DL (ref 8.8–10.2)
CHLORIDE SERPL-SCNC: 106 MEQ/L (ref 98–107)
CO2 SERPL-SCNC: 27 MEQ/L (ref 21–32)
CREAT SERPL-MCNC: 1.31 MG/DL (ref 0.55–1.3)
DIGOXIN SERPL-MCNC: 3 NG/ML (ref 0.5–2)
GFR SERPL CREATININE-BSD FRML MDRD: 40.7 ML/MIN/{1.73_M2} (ref 32–?)
GLUCOSE SERPL-MCNC: 156 MG/DL (ref 70–100)
HCT VFR BLD AUTO: 29.6 % (ref 36–47)
HGB BLD-MCNC: 9.6 G/DL (ref 12–15.5)
INR PPP: 5.25
MAGNESIUM SERPL-MCNC: 2.6 MG/DL (ref 1.8–2.4)
MCH RBC QN AUTO: 32.3 PG (ref 27–33)
MCHC RBC AUTO-ENTMCNC: 32.4 G/DL (ref 32–36.5)
MCV RBC AUTO: 99.7 FL (ref 80–96)
PLATELET # BLD AUTO: 230 10^3/UL (ref 150–450)
POTASSIUM SERPL-SCNC: 4.3 MEQ/L (ref 3.5–5.1)
PROTHROMBIN TIME: 49.4 SECONDS (ref 12.5–14.3)
RBC # BLD AUTO: 2.97 10^6/UL (ref 4–5.4)
SODIUM SERPL-SCNC: 139 MEQ/L (ref 136–145)
TSH SERPL DL<=0.005 MIU/L-ACNC: 7.58 UIU/ML (ref 0.36–3.74)
WBC # BLD AUTO: 8.8 10^3/UL (ref 4–10)

## 2021-01-04 ENCOUNTER — HOSPITAL ENCOUNTER (OUTPATIENT)
Dept: HOSPITAL 53 - SSVSNF3 | Age: 86
End: 2021-01-04
Attending: INTERNAL MEDICINE
Payer: MEDICARE

## 2021-01-04 DIAGNOSIS — Z20.822: Primary | ICD-10-CM

## 2021-01-04 DIAGNOSIS — I48.91: ICD-10-CM

## 2021-01-04 PROCEDURE — 80162 ASSAY OF DIGOXIN TOTAL: CPT

## 2021-01-06 ENCOUNTER — HOSPITAL ENCOUNTER (OUTPATIENT)
Dept: HOSPITAL 53 - SSVSNF3 | Age: 86
End: 2021-01-06
Attending: PHYSICIAN ASSISTANT
Payer: MEDICARE

## 2021-01-06 DIAGNOSIS — I48.91: Primary | ICD-10-CM

## 2021-01-06 LAB
INR PPP: 4.05
PROTHROMBIN TIME: 40.3 SECONDS (ref 12.5–14.3)

## 2021-01-07 ENCOUNTER — HOSPITAL ENCOUNTER (OUTPATIENT)
Dept: HOSPITAL 53 - SSVSNF3 | Age: 86
End: 2021-01-07
Attending: INTERNAL MEDICINE
Payer: MEDICARE

## 2021-01-07 DIAGNOSIS — I48.91: Primary | ICD-10-CM

## 2021-01-07 LAB
INR PPP: 3.08
PROTHROMBIN TIME: 32.5 SECONDS (ref 12.5–14.3)

## 2021-01-08 ENCOUNTER — HOSPITAL ENCOUNTER (OUTPATIENT)
Dept: HOSPITAL 53 - SSVSNF3 | Age: 86
End: 2021-01-08
Attending: INTERNAL MEDICINE
Payer: MEDICARE

## 2021-01-08 DIAGNOSIS — Z20.822: Primary | ICD-10-CM

## 2021-01-12 ENCOUNTER — HOSPITAL ENCOUNTER (OUTPATIENT)
Dept: HOSPITAL 53 - SSVSNF3 | Age: 86
End: 2021-01-12
Attending: INTERNAL MEDICINE
Payer: MEDICARE

## 2021-01-12 DIAGNOSIS — I48.91: Primary | ICD-10-CM

## 2021-01-12 LAB
BUN SERPL-MCNC: 40 MG/DL (ref 7–18)
CALCIUM SERPL-MCNC: 8.9 MG/DL (ref 8.8–10.2)
CHLORIDE SERPL-SCNC: 104 MEQ/L (ref 98–107)
CO2 SERPL-SCNC: 29 MEQ/L (ref 21–32)
CREAT SERPL-MCNC: 1.34 MG/DL (ref 0.55–1.3)
DIGOXIN SERPL-MCNC: 1.1 NG/ML (ref 0.5–2)
GFR SERPL CREATININE-BSD FRML MDRD: 39.6 ML/MIN/{1.73_M2} (ref 32–?)
GLUCOSE SERPL-MCNC: 113 MG/DL (ref 70–100)
HCT VFR BLD AUTO: 29.7 % (ref 36–47)
HGB BLD-MCNC: 9.3 G/DL (ref 12–15.5)
MAGNESIUM SERPL-MCNC: 2.6 MG/DL (ref 1.8–2.4)
MCH RBC QN AUTO: 32.2 PG (ref 27–33)
MCHC RBC AUTO-ENTMCNC: 31.3 G/DL (ref 32–36.5)
MCV RBC AUTO: 102.8 FL (ref 80–96)
NT-PRO BNP: 3825 PG/ML (ref ?–450)
PLATELET # BLD AUTO: 260 10^3/UL (ref 150–450)
POTASSIUM SERPL-SCNC: 4.5 MEQ/L (ref 3.5–5.1)
RBC # BLD AUTO: 2.89 10^6/UL (ref 4–5.4)
SODIUM SERPL-SCNC: 138 MEQ/L (ref 136–145)
TSH SERPL DL<=0.005 MIU/L-ACNC: 8.11 UIU/ML (ref 0.36–3.74)
WBC # BLD AUTO: 6 10^3/UL (ref 4–10)

## 2021-01-13 ENCOUNTER — HOSPITAL ENCOUNTER (OUTPATIENT)
Dept: HOSPITAL 53 - SSVSNF3 | Age: 86
End: 2021-01-13
Attending: INTERNAL MEDICINE
Payer: MEDICARE

## 2021-01-13 DIAGNOSIS — Z20.822: ICD-10-CM

## 2021-01-13 DIAGNOSIS — I48.91: Primary | ICD-10-CM

## 2021-01-13 LAB
INR PPP: 2.02
PROTHROMBIN TIME: 23.3 SECONDS (ref 12.5–14.3)

## 2021-01-13 PROCEDURE — 85610 PROTHROMBIN TIME: CPT

## 2021-01-13 PROCEDURE — 36415 COLL VENOUS BLD VENIPUNCTURE: CPT

## 2021-01-20 ENCOUNTER — HOSPITAL ENCOUNTER (OUTPATIENT)
Dept: HOSPITAL 53 - SSVSNF3 | Age: 86
End: 2021-01-20
Attending: INTERNAL MEDICINE
Payer: MEDICARE

## 2021-01-20 DIAGNOSIS — Z11.52: ICD-10-CM

## 2021-01-20 DIAGNOSIS — I48.91: Primary | ICD-10-CM

## 2021-01-20 LAB
INR PPP: 2.29
PROTHROMBIN TIME: 25.7 SECONDS (ref 12.5–14.3)

## 2021-01-20 PROCEDURE — 36415 COLL VENOUS BLD VENIPUNCTURE: CPT

## 2021-01-20 PROCEDURE — 85610 PROTHROMBIN TIME: CPT

## 2021-01-27 ENCOUNTER — HOSPITAL ENCOUNTER (OUTPATIENT)
Dept: HOSPITAL 53 - SSVSNF3 | Age: 86
End: 2021-01-27
Attending: INTERNAL MEDICINE
Payer: MEDICARE

## 2021-01-27 DIAGNOSIS — Z79.899: ICD-10-CM

## 2021-01-27 DIAGNOSIS — Z20.822: Primary | ICD-10-CM

## 2021-01-27 DIAGNOSIS — Z79.01: ICD-10-CM

## 2021-01-27 LAB
BUN SERPL-MCNC: 34 MG/DL (ref 7–18)
CALCIUM SERPL-MCNC: 9.1 MG/DL (ref 8.8–10.2)
CHLORIDE SERPL-SCNC: 103 MEQ/L (ref 98–107)
CO2 SERPL-SCNC: 30 MEQ/L (ref 21–32)
CREAT SERPL-MCNC: 1.18 MG/DL (ref 0.55–1.3)
GFR SERPL CREATININE-BSD FRML MDRD: 45.9 ML/MIN/{1.73_M2} (ref 32–?)
GLUCOSE SERPL-MCNC: 120 MG/DL (ref 70–100)
HCT VFR BLD AUTO: 29 % (ref 36–47)
HGB BLD-MCNC: 9.1 G/DL (ref 12–15.5)
INR PPP: 2.16
MAGNESIUM SERPL-MCNC: 2.3 MG/DL (ref 1.8–2.4)
MCH RBC QN AUTO: 31.3 PG (ref 27–33)
MCHC RBC AUTO-ENTMCNC: 31.4 G/DL (ref 32–36.5)
MCV RBC AUTO: 99.7 FL (ref 80–96)
PLATELET # BLD AUTO: 234 10^3/UL (ref 150–450)
POTASSIUM SERPL-SCNC: 4.4 MEQ/L (ref 3.5–5.1)
PROTHROMBIN TIME: 24.6 SECONDS (ref 12.5–14.3)
RBC # BLD AUTO: 2.91 10^6/UL (ref 4–5.4)
SODIUM SERPL-SCNC: 139 MEQ/L (ref 136–145)
TSH SERPL DL<=0.005 MIU/L-ACNC: 7.55 UIU/ML (ref 0.36–3.74)
WBC # BLD AUTO: 4.8 10^3/UL (ref 4–10)

## 2021-01-27 PROCEDURE — 85610 PROTHROMBIN TIME: CPT

## 2021-01-27 PROCEDURE — 80048 BASIC METABOLIC PNL TOTAL CA: CPT

## 2021-01-27 PROCEDURE — 84443 ASSAY THYROID STIM HORMONE: CPT

## 2021-01-27 PROCEDURE — 83880 ASSAY OF NATRIURETIC PEPTIDE: CPT

## 2021-01-27 PROCEDURE — 36415 COLL VENOUS BLD VENIPUNCTURE: CPT

## 2021-01-27 PROCEDURE — 83735 ASSAY OF MAGNESIUM: CPT

## 2021-01-27 PROCEDURE — 85027 COMPLETE CBC AUTOMATED: CPT

## 2021-01-28 ENCOUNTER — HOSPITAL ENCOUNTER (OUTPATIENT)
Dept: HOSPITAL 53 - SSVSNF3 | Age: 86
End: 2021-01-28
Attending: INTERNAL MEDICINE
Payer: MEDICARE

## 2021-01-28 DIAGNOSIS — R09.02: ICD-10-CM

## 2021-01-28 DIAGNOSIS — R05: Primary | ICD-10-CM

## 2021-01-28 LAB
BASOPHILS # BLD AUTO: 0 10^3/UL (ref 0–0.2)
BASOPHILS NFR BLD AUTO: 0.6 % (ref 0–1)
BUN SERPL-MCNC: 31 MG/DL (ref 7–18)
CALCIUM SERPL-MCNC: 9.4 MG/DL (ref 8.8–10.2)
CHLORIDE SERPL-SCNC: 105 MEQ/L (ref 98–107)
CO2 SERPL-SCNC: 30 MEQ/L (ref 21–32)
CREAT SERPL-MCNC: 1.23 MG/DL (ref 0.55–1.3)
EOSINOPHIL # BLD AUTO: 0.2 10^3/UL (ref 0–0.5)
EOSINOPHIL NFR BLD AUTO: 2.8 % (ref 0–3)
FLUAV RNA UPPER RESP QL NAA+PROBE: NEGATIVE
FLUBV RNA UPPER RESP QL NAA+PROBE: NEGATIVE
GFR SERPL CREATININE-BSD FRML MDRD: 43.8 ML/MIN/{1.73_M2} (ref 32–?)
GLUCOSE SERPL-MCNC: 101 MG/DL (ref 70–100)
HCT VFR BLD AUTO: 30.1 % (ref 36–47)
HGB BLD-MCNC: 9.4 G/DL (ref 12–15.5)
LYMPHOCYTES # BLD AUTO: 1.4 10^3/UL (ref 1.5–5)
LYMPHOCYTES NFR BLD AUTO: 21.2 % (ref 24–44)
MCH RBC QN AUTO: 30.8 PG (ref 27–33)
MCHC RBC AUTO-ENTMCNC: 31.2 G/DL (ref 32–36.5)
MCV RBC AUTO: 98.7 FL (ref 80–96)
MONOCYTES # BLD AUTO: 1 10^3/UL (ref 0–0.8)
MONOCYTES NFR BLD AUTO: 15.8 % (ref 0–5)
NEUTROPHILS # BLD AUTO: 3.8 10^3/UL (ref 1.5–8.5)
NEUTROPHILS NFR BLD AUTO: 57.9 % (ref 36–66)
PLATELET # BLD AUTO: 266 10^3/UL (ref 150–450)
POTASSIUM SERPL-SCNC: 4.4 MEQ/L (ref 3.5–5.1)
RBC # BLD AUTO: 3.05 10^6/UL (ref 4–5.4)
SODIUM SERPL-SCNC: 140 MEQ/L (ref 136–145)
WBC # BLD AUTO: 6.5 10^3/UL (ref 4–10)

## 2021-01-28 NOTE — REPPI
INDICATION:

HYPOXIA 317-2.



COMPARISON:

Comparison chest x-ray December 7, 2020..



TECHNIQUE:

Upright portable chest radiograph.



FINDINGS:

A bipolar pacemaker is seen in place via the left side.  Moderate cardiac enlargement

is observed as before.  Pulmonary vasculature is cephalized.  There is hazy opacity in

the left base obscuring the left diaphragm consistent with left pleural effusion.

Mild interstitial edema pattern is present.  No focal infiltrate.  There are

degenerative changes in the thoracic aorta and in the shoulders.



IMPRESSION:

CHF pattern with mild interstitial edema, left pleural effusion, cardiomegaly,

vascular congestion and cephalization.  Pacemaker in place.





<Electronically signed by Harris Fisher > 01/28/21 [id number]

## 2021-01-29 ENCOUNTER — HOSPITAL ENCOUNTER (OUTPATIENT)
Dept: HOSPITAL 53 - SSVSNF3 | Age: 86
End: 2021-01-29
Attending: INTERNAL MEDICINE
Payer: MEDICARE

## 2021-01-29 DIAGNOSIS — R09.02: Primary | ICD-10-CM

## 2021-01-29 LAB
BUN SERPL-MCNC: 31 MG/DL (ref 7–18)
CALCIUM SERPL-MCNC: 8.9 MG/DL (ref 8.8–10.2)
CHLORIDE SERPL-SCNC: 103 MEQ/L (ref 98–107)
CO2 SERPL-SCNC: 32 MEQ/L (ref 21–32)
CREAT SERPL-MCNC: 1.21 MG/DL (ref 0.55–1.3)
GFR SERPL CREATININE-BSD FRML MDRD: 44.6 ML/MIN/{1.73_M2} (ref 32–?)
GLUCOSE SERPL-MCNC: 85 MG/DL (ref 70–100)
POTASSIUM SERPL-SCNC: 3.9 MEQ/L (ref 3.5–5.1)
SODIUM SERPL-SCNC: 142 MEQ/L (ref 136–145)

## 2021-02-01 ENCOUNTER — HOSPITAL ENCOUNTER (OUTPATIENT)
Dept: HOSPITAL 53 - SSVSNF3 | Age: 86
End: 2021-02-01
Attending: INTERNAL MEDICINE
Payer: MEDICARE

## 2021-02-01 DIAGNOSIS — Z95.0: ICD-10-CM

## 2021-02-01 DIAGNOSIS — I51.7: Primary | ICD-10-CM

## 2021-02-01 DIAGNOSIS — R91.8: ICD-10-CM

## 2021-02-01 DIAGNOSIS — I50.9: ICD-10-CM

## 2021-02-01 LAB
BUN SERPL-MCNC: 32 MG/DL (ref 7–18)
CALCIUM SERPL-MCNC: 8.8 MG/DL (ref 8.8–10.2)
CHLORIDE SERPL-SCNC: 101 MEQ/L (ref 98–107)
CO2 SERPL-SCNC: 31 MEQ/L (ref 21–32)
CREAT SERPL-MCNC: 1.13 MG/DL (ref 0.55–1.3)
GFR SERPL CREATININE-BSD FRML MDRD: 48.3 ML/MIN/{1.73_M2} (ref 32–?)
GLUCOSE SERPL-MCNC: 98 MG/DL (ref 70–100)
POTASSIUM SERPL-SCNC: 3.6 MEQ/L (ref 3.5–5.1)
SODIUM SERPL-SCNC: 141 MEQ/L (ref 136–145)

## 2021-02-01 NOTE — REPPI
INDICATION:

-2.



COMPARISON:

Comparison chest x-ray 28 January 2021..



TECHNIQUE:

Semi-erect AP portable chest x-ray.



FINDINGS:

A bipolar pacemaker is again noted via the left side.  Moderate cardiac enlargement is

observed.  Pulmonary vascular cephalization and congestion is seen in there is diffuse

mild interstitial edema pattern in the lung fields.  There is pleural type opacity in

the left base and to a lesser extent right base and consistent with small pleural

effusions.  No focal infiltrate is appreciated.



IMPRESSION:

CHF pattern with mild interstitial edema and vascular congestion.  Cardiomegaly.





<Electronically signed by Harris Fisher > 02/01/21 [id number]

## 2021-02-03 ENCOUNTER — HOSPITAL ENCOUNTER (OUTPATIENT)
Dept: HOSPITAL 53 - SSVSNF3 | Age: 86
End: 2021-02-03
Attending: INTERNAL MEDICINE
Payer: MEDICARE

## 2021-02-03 DIAGNOSIS — I48.91: ICD-10-CM

## 2021-02-03 DIAGNOSIS — Z20.822: ICD-10-CM

## 2021-02-03 DIAGNOSIS — I50.9: Primary | ICD-10-CM

## 2021-02-03 LAB
INR PPP: 1.81
PROTHROMBIN TIME: 21.4 SECONDS (ref 12.5–14.3)

## 2021-02-03 PROCEDURE — 36415 COLL VENOUS BLD VENIPUNCTURE: CPT

## 2021-02-03 PROCEDURE — 85610 PROTHROMBIN TIME: CPT

## 2021-02-03 PROCEDURE — 80162 ASSAY OF DIGOXIN TOTAL: CPT

## 2021-02-04 ENCOUNTER — HOSPITAL ENCOUNTER (OUTPATIENT)
Dept: HOSPITAL 53 - SSVSNF3 | Age: 86
End: 2021-02-04
Attending: INTERNAL MEDICINE
Payer: MEDICARE

## 2021-02-04 DIAGNOSIS — I50.9: Primary | ICD-10-CM

## 2021-02-04 LAB
BUN SERPL-MCNC: 42 MG/DL (ref 7–18)
CALCIUM SERPL-MCNC: 8.9 MG/DL (ref 8.8–10.2)
CHLORIDE SERPL-SCNC: 102 MEQ/L (ref 98–107)
CO2 SERPL-SCNC: 30 MEQ/L (ref 21–32)
CREAT SERPL-MCNC: 1.22 MG/DL (ref 0.55–1.3)
GFR SERPL CREATININE-BSD FRML MDRD: 44.2 ML/MIN/{1.73_M2} (ref 32–?)
GLUCOSE SERPL-MCNC: 91 MG/DL (ref 70–100)
POTASSIUM SERPL-SCNC: 4.3 MEQ/L (ref 3.5–5.1)
SODIUM SERPL-SCNC: 141 MEQ/L (ref 136–145)

## 2021-02-08 ENCOUNTER — HOSPITAL ENCOUNTER (OUTPATIENT)
Dept: HOSPITAL 53 - SSVSNF3 | Age: 86
End: 2021-02-08
Attending: INTERNAL MEDICINE
Payer: MEDICARE

## 2021-02-08 DIAGNOSIS — I50.9: ICD-10-CM

## 2021-02-08 DIAGNOSIS — I51.7: Primary | ICD-10-CM

## 2021-02-08 DIAGNOSIS — Z95.0: ICD-10-CM

## 2021-02-08 LAB
DIGOXIN SERPL-MCNC: 0.9 NG/ML (ref 0.5–2)
INR PPP: 1.87
PROTHROMBIN TIME: 21.9 SECONDS (ref 12.5–14.3)
TSH SERPL DL<=0.005 MIU/L-ACNC: 3.76 UIU/ML (ref 0.36–3.74)

## 2021-02-08 NOTE — REPPI
INDICATION:

CHF RECHECK 317-2.



COMPARISON:

Comparison chest x-ray 1 February 2021..



TECHNIQUE:

Erect AP portable chest radiograph.



FINDINGS:

A bipolar pacemaker is seen in the enlarged right heart view of the left side as

before.  The lungs are symmetrically aerated and free of infiltrate.  Pulmonary

vasculature is less congested than on the February 1, 2021 study.  No pleural effusion

is seen.  No new infiltrate is noted.  There are advanced arthritic changes in the

glenohumeral articulations bilaterally.  The thoracic aorta is calcific.



IMPRESSION:

Improved pulmonary vascular congestion.  Cardiomegaly with pacemaker persists.  No

pleural effusion seen.





<Electronically signed by Harris Fisher > 02/08/21 [id number]

## 2021-02-10 ENCOUNTER — HOSPITAL ENCOUNTER (OUTPATIENT)
Dept: HOSPITAL 53 - SSVSNF3 | Age: 86
End: 2021-02-10
Attending: INTERNAL MEDICINE
Payer: MEDICARE

## 2021-02-10 DIAGNOSIS — I48.91: Primary | ICD-10-CM

## 2021-02-10 DIAGNOSIS — Z20.822: ICD-10-CM

## 2021-02-10 LAB
INR PPP: 1.99
PROTHROMBIN TIME: 23 SECONDS (ref 12.5–14.3)

## 2021-02-10 PROCEDURE — 85610 PROTHROMBIN TIME: CPT

## 2021-02-10 PROCEDURE — 36415 COLL VENOUS BLD VENIPUNCTURE: CPT

## 2021-02-17 ENCOUNTER — HOSPITAL ENCOUNTER (OUTPATIENT)
Dept: HOSPITAL 53 - SSVSNF3 | Age: 86
End: 2021-02-17
Attending: INTERNAL MEDICINE
Payer: MEDICARE

## 2021-02-17 DIAGNOSIS — I48.91: Primary | ICD-10-CM

## 2021-02-17 DIAGNOSIS — Z20.822: ICD-10-CM

## 2021-02-17 LAB
INR PPP: 1.82
PROTHROMBIN TIME: 21.5 SECONDS (ref 12.5–14.3)

## 2021-02-17 PROCEDURE — 85610 PROTHROMBIN TIME: CPT

## 2021-02-17 PROCEDURE — 36415 COLL VENOUS BLD VENIPUNCTURE: CPT

## 2021-02-24 ENCOUNTER — HOSPITAL ENCOUNTER (OUTPATIENT)
Dept: HOSPITAL 53 - SSVSNF3 | Age: 86
End: 2021-02-24
Attending: INTERNAL MEDICINE
Payer: MEDICARE

## 2021-02-24 DIAGNOSIS — I48.91: Primary | ICD-10-CM

## 2021-02-24 DIAGNOSIS — Z20.822: ICD-10-CM

## 2021-02-24 LAB
INR PPP: 1.86
PROTHROMBIN TIME: 21.8 SECONDS (ref 12.5–14.3)

## 2021-02-24 PROCEDURE — 85610 PROTHROMBIN TIME: CPT

## 2021-02-24 PROCEDURE — 36415 COLL VENOUS BLD VENIPUNCTURE: CPT

## 2021-03-03 ENCOUNTER — HOSPITAL ENCOUNTER (OUTPATIENT)
Dept: HOSPITAL 53 - SSVSNF3 | Age: 86
End: 2021-03-03
Attending: INTERNAL MEDICINE
Payer: MEDICARE

## 2021-03-03 DIAGNOSIS — Z20.822: ICD-10-CM

## 2021-03-03 DIAGNOSIS — I48.91: Primary | ICD-10-CM

## 2021-03-03 LAB
INR PPP: 2
PROTHROMBIN TIME: 23.1 SECONDS (ref 12.5–14.3)

## 2021-03-03 PROCEDURE — 36415 COLL VENOUS BLD VENIPUNCTURE: CPT

## 2021-03-03 PROCEDURE — 85610 PROTHROMBIN TIME: CPT

## 2021-03-10 ENCOUNTER — HOSPITAL ENCOUNTER (OUTPATIENT)
Dept: HOSPITAL 53 - SSVSNF3 | Age: 86
End: 2021-03-10
Attending: INTERNAL MEDICINE
Payer: MEDICARE

## 2021-03-10 DIAGNOSIS — I51.7: Primary | ICD-10-CM

## 2021-03-10 DIAGNOSIS — Z95.0: ICD-10-CM

## 2021-03-10 DIAGNOSIS — I48.91: ICD-10-CM

## 2021-03-10 DIAGNOSIS — Z20.822: ICD-10-CM

## 2021-03-10 LAB
INR PPP: 1.8
PROTHROMBIN TIME: 21.3 SECONDS (ref 12.5–14.3)

## 2021-03-10 PROCEDURE — 85610 PROTHROMBIN TIME: CPT

## 2021-03-10 PROCEDURE — 36415 COLL VENOUS BLD VENIPUNCTURE: CPT

## 2021-03-10 PROCEDURE — 71045 X-RAY EXAM CHEST 1 VIEW: CPT

## 2021-03-10 NOTE — REPPI
INDICATION:

317-2 CHF



COMPARISON:

02/08/2021



TECHNIQUE:

Portable AP view of the chest



FINDINGS:

The mediastinum demonstrates stable cardiomegaly and dual lead pacemaker placement.

The lung fields demonstrate chronic increased interstitial markings which are

nonspecific.  No focal consolidation.  No effusion.  No pneumothorax.  Skeletal

structures demonstrate stable degenerative changes.



IMPRESSION:

Chronic cardiomegaly and stable chronic interstitial changes.

Interstitial edema cannot be excluded. No focal consolidation or effusion.





<Electronically signed by Alvarez Jose > 03/10/21 [id number]

## 2021-03-11 ENCOUNTER — HOSPITAL ENCOUNTER (OUTPATIENT)
Dept: HOSPITAL 53 - SSVSNF3 | Age: 86
End: 2021-03-11
Attending: PHYSICIAN ASSISTANT
Payer: MEDICARE

## 2021-03-11 DIAGNOSIS — I50.9: Primary | ICD-10-CM

## 2021-03-11 LAB
BUN SERPL-MCNC: 36 MG/DL (ref 7–18)
CALCIUM SERPL-MCNC: 9.2 MG/DL (ref 8.8–10.2)
CHLORIDE SERPL-SCNC: 100 MEQ/L (ref 98–107)
CO2 SERPL-SCNC: 32 MEQ/L (ref 21–32)
CREAT SERPL-MCNC: 1.3 MG/DL (ref 0.55–1.3)
GFR SERPL CREATININE-BSD FRML MDRD: 41.1 ML/MIN/{1.73_M2} (ref 32–?)
GLUCOSE SERPL-MCNC: 98 MG/DL (ref 70–100)
HCT VFR BLD AUTO: 30.7 % (ref 36–47)
HGB BLD-MCNC: 9.9 G/DL (ref 12–15.5)
MCH RBC QN AUTO: 30.8 PG (ref 27–33)
MCHC RBC AUTO-ENTMCNC: 32.2 G/DL (ref 32–36.5)
MCV RBC AUTO: 95.6 FL (ref 80–96)
NT-PRO BNP: 4190 PG/ML (ref ?–450)
PLATELET # BLD AUTO: 218 10^3/UL (ref 150–450)
POTASSIUM SERPL-SCNC: 4.1 MEQ/L (ref 3.5–5.1)
RBC # BLD AUTO: 3.21 10^6/UL (ref 4–5.4)
SODIUM SERPL-SCNC: 137 MEQ/L (ref 136–145)
WBC # BLD AUTO: 4.8 10^3/UL (ref 4–10)

## 2021-03-17 ENCOUNTER — HOSPITAL ENCOUNTER (OUTPATIENT)
Dept: HOSPITAL 53 - SSVSNF3 | Age: 86
End: 2021-03-17
Attending: INTERNAL MEDICINE
Payer: MEDICARE

## 2021-03-17 DIAGNOSIS — I48.91: Primary | ICD-10-CM

## 2021-03-17 LAB
INR PPP: 1.53
PROTHROMBIN TIME: 18.7 SECONDS (ref 12.5–14.3)

## 2021-03-22 ENCOUNTER — HOSPITAL ENCOUNTER (OUTPATIENT)
Dept: HOSPITAL 53 - SSVSNF3 | Age: 86
End: 2021-03-22
Attending: INTERNAL MEDICINE
Payer: MEDICARE

## 2021-03-22 DIAGNOSIS — I48.91: Primary | ICD-10-CM

## 2021-03-22 DIAGNOSIS — Z79.899: ICD-10-CM

## 2021-03-22 LAB
INR PPP: 1.78
PROTHROMBIN TIME: 21.1 SECONDS (ref 12.5–14.3)

## 2021-03-24 ENCOUNTER — HOSPITAL ENCOUNTER (OUTPATIENT)
Dept: HOSPITAL 53 - SSVSNF3 | Age: 86
End: 2021-03-24
Attending: INTERNAL MEDICINE
Payer: MEDICARE

## 2021-03-24 DIAGNOSIS — E03.9: Primary | ICD-10-CM

## 2021-03-24 DIAGNOSIS — I48.91: ICD-10-CM

## 2021-03-24 LAB
BUN SERPL-MCNC: 41 MG/DL (ref 7–18)
CALCIUM SERPL-MCNC: 8.9 MG/DL (ref 8.8–10.2)
CHLORIDE SERPL-SCNC: 104 MEQ/L (ref 98–107)
CO2 SERPL-SCNC: 32 MEQ/L (ref 21–32)
CREAT SERPL-MCNC: 1.33 MG/DL (ref 0.55–1.3)
GFR SERPL CREATININE-BSD FRML MDRD: 40 ML/MIN/{1.73_M2} (ref 32–?)
GLUCOSE SERPL-MCNC: 93 MG/DL (ref 70–100)
HCT VFR BLD AUTO: 30.2 % (ref 36–47)
HGB BLD-MCNC: 9.6 G/DL (ref 12–15.5)
INR PPP: 1.98
MAGNESIUM SERPL-MCNC: 2.6 MG/DL (ref 1.8–2.4)
MCH RBC QN AUTO: 30.4 PG (ref 27–33)
MCHC RBC AUTO-ENTMCNC: 31.8 G/DL (ref 32–36.5)
MCV RBC AUTO: 95.6 FL (ref 80–96)
PLATELET # BLD AUTO: 221 10^3/UL (ref 150–450)
POTASSIUM SERPL-SCNC: 4.4 MEQ/L (ref 3.5–5.1)
PROTHROMBIN TIME: 22.9 SECONDS (ref 12.5–14.3)
RBC # BLD AUTO: 3.16 10^6/UL (ref 4–5.4)
SODIUM SERPL-SCNC: 140 MEQ/L (ref 136–145)
TSH SERPL DL<=0.005 MIU/L-ACNC: 0.85 UIU/ML (ref 0.36–3.74)
WBC # BLD AUTO: 3.8 10^3/UL (ref 4–10)

## 2021-03-31 ENCOUNTER — HOSPITAL ENCOUNTER (OUTPATIENT)
Dept: HOSPITAL 53 - SSVSNF3 | Age: 86
End: 2021-03-31
Attending: INTERNAL MEDICINE
Payer: MEDICARE

## 2021-03-31 DIAGNOSIS — I48.91: Primary | ICD-10-CM

## 2021-03-31 LAB
INR PPP: 2.21
PROTHROMBIN TIME: 25 SECONDS (ref 12.5–14.3)

## 2021-04-07 ENCOUNTER — HOSPITAL ENCOUNTER (OUTPATIENT)
Dept: HOSPITAL 53 - SSVSNF3 | Age: 86
End: 2021-04-07
Attending: INTERNAL MEDICINE
Payer: MEDICARE

## 2021-04-07 DIAGNOSIS — I50.9: Primary | ICD-10-CM

## 2021-04-07 LAB
INR PPP: 2.15
PROTHROMBIN TIME: 24.4 SECONDS (ref 12.5–14.3)

## 2021-04-13 ENCOUNTER — HOSPITAL ENCOUNTER (OUTPATIENT)
Dept: HOSPITAL 53 - SSVSNF3 | Age: 86
End: 2021-04-13
Attending: INTERNAL MEDICINE
Payer: MEDICARE

## 2021-04-13 DIAGNOSIS — Z20.822: Primary | ICD-10-CM

## 2021-04-14 ENCOUNTER — HOSPITAL ENCOUNTER (OUTPATIENT)
Dept: HOSPITAL 53 - SSVSNF3 | Age: 86
End: 2021-04-14
Attending: INTERNAL MEDICINE
Payer: MEDICARE

## 2021-04-14 DIAGNOSIS — I48.91: Primary | ICD-10-CM

## 2021-04-14 LAB
INR PPP: 2.47
PROTHROMBIN TIME: 27.3 SECONDS (ref 12.5–14.3)

## 2021-04-20 ENCOUNTER — HOSPITAL ENCOUNTER (OUTPATIENT)
Dept: HOSPITAL 53 - SSVSNF3 | Age: 86
End: 2021-04-20
Attending: INTERNAL MEDICINE
Payer: MEDICARE

## 2021-04-20 DIAGNOSIS — Z20.822: Primary | ICD-10-CM

## 2021-04-21 ENCOUNTER — HOSPITAL ENCOUNTER (OUTPATIENT)
Dept: HOSPITAL 53 - SSVSNF3 | Age: 86
End: 2021-04-21
Attending: INTERNAL MEDICINE
Payer: MEDICARE

## 2021-04-21 DIAGNOSIS — I48.91: Primary | ICD-10-CM

## 2021-04-21 LAB
INR PPP: 2.67
PROTHROMBIN TIME: 29.1 SECONDS (ref 12.5–14.3)

## 2021-04-28 ENCOUNTER — HOSPITAL ENCOUNTER (OUTPATIENT)
Dept: HOSPITAL 53 - SSVSNF3 | Age: 86
End: 2021-04-28
Attending: INTERNAL MEDICINE
Payer: MEDICARE

## 2021-04-28 DIAGNOSIS — I48.91: Primary | ICD-10-CM

## 2021-04-28 LAB
BUN SERPL-MCNC: 50 MG/DL (ref 7–18)
CALCIUM SERPL-MCNC: 9.5 MG/DL (ref 8.8–10.2)
CHLORIDE SERPL-SCNC: 101 MEQ/L (ref 98–107)
CO2 SERPL-SCNC: 32 MEQ/L (ref 21–32)
CREAT SERPL-MCNC: 1.21 MG/DL (ref 0.55–1.3)
GFR SERPL CREATININE-BSD FRML MDRD: 44.6 ML/MIN/{1.73_M2} (ref 32–?)
GLUCOSE SERPL-MCNC: 135 MG/DL (ref 70–100)
HCT VFR BLD AUTO: 31.5 % (ref 36–47)
HGB BLD-MCNC: 10.1 G/DL (ref 12–15.5)
INR PPP: 2.23
MAGNESIUM SERPL-MCNC: 2.5 MG/DL (ref 1.8–2.4)
MCH RBC QN AUTO: 30.3 PG (ref 27–33)
MCHC RBC AUTO-ENTMCNC: 32.1 G/DL (ref 32–36.5)
MCV RBC AUTO: 94.6 FL (ref 80–96)
PLATELET # BLD AUTO: 236 10^3/UL (ref 150–450)
POTASSIUM SERPL-SCNC: 4.4 MEQ/L (ref 3.5–5.1)
PROTHROMBIN TIME: 25.2 SECONDS (ref 12.5–14.3)
RBC # BLD AUTO: 3.33 10^6/UL (ref 4–5.4)
SODIUM SERPL-SCNC: 138 MEQ/L (ref 136–145)
TSH SERPL DL<=0.005 MIU/L-ACNC: 2.11 UIU/ML (ref 0.36–3.74)
WBC # BLD AUTO: 4.9 10^3/UL (ref 4–10)

## 2021-05-05 ENCOUNTER — HOSPITAL ENCOUNTER (OUTPATIENT)
Dept: HOSPITAL 53 - SSVSNF3 | Age: 86
End: 2021-05-05
Attending: INTERNAL MEDICINE
Payer: MEDICARE

## 2021-05-05 DIAGNOSIS — I48.91: Primary | ICD-10-CM

## 2021-05-05 DIAGNOSIS — Z79.899: ICD-10-CM

## 2021-05-05 LAB
INR PPP: 2.46
PROTHROMBIN TIME: 27.3 SECONDS (ref 12.5–14.3)

## 2021-05-12 ENCOUNTER — HOSPITAL ENCOUNTER (OUTPATIENT)
Dept: HOSPITAL 53 - SSVSNF3 | Age: 86
End: 2021-05-12
Attending: INTERNAL MEDICINE
Payer: MEDICARE

## 2021-05-12 DIAGNOSIS — I50.9: Primary | ICD-10-CM

## 2021-05-12 LAB
INR PPP: 2.05
PROTHROMBIN TIME: 23.6 SECONDS (ref 12.5–14.3)

## 2021-05-19 ENCOUNTER — HOSPITAL ENCOUNTER (OUTPATIENT)
Dept: HOSPITAL 53 - SSVSNF3 | Age: 86
End: 2021-05-19
Attending: INTERNAL MEDICINE
Payer: MEDICARE

## 2021-05-19 DIAGNOSIS — I48.91: Primary | ICD-10-CM

## 2021-05-19 DIAGNOSIS — Z79.899: ICD-10-CM

## 2021-05-19 LAB
HCT VFR BLD AUTO: 27.9 % (ref 36–47)
HGB BLD-MCNC: 8.9 G/DL (ref 12–15.5)
INR PPP: 1.91
MCH RBC QN AUTO: 30.8 PG (ref 27–33)
MCHC RBC AUTO-ENTMCNC: 31.9 G/DL (ref 32–36.5)
MCV RBC AUTO: 96.5 FL (ref 80–96)
PLATELET # BLD AUTO: 181 10^3/UL (ref 150–450)
PROTHROMBIN TIME: 22.3 SECONDS (ref 12.5–14.3)
RBC # BLD AUTO: 2.89 10^6/UL (ref 4–5.4)
WBC # BLD AUTO: 3.5 10^3/UL (ref 4–10)

## 2021-05-21 ENCOUNTER — HOSPITAL ENCOUNTER (OUTPATIENT)
Dept: HOSPITAL 53 - SSVSNF3 | Age: 86
End: 2021-05-21
Attending: INTERNAL MEDICINE
Payer: MEDICARE

## 2021-05-21 DIAGNOSIS — I48.91: Primary | ICD-10-CM

## 2021-05-21 LAB
INR PPP: 1.37
PROTHROMBIN TIME: 17.2 SECONDS (ref 12.5–14.3)

## 2021-05-25 ENCOUNTER — HOSPITAL ENCOUNTER (OUTPATIENT)
Dept: HOSPITAL 53 - SSVSNF3 | Age: 86
End: 2021-05-25
Attending: INTERNAL MEDICINE
Payer: MEDICARE

## 2021-05-25 DIAGNOSIS — I50.9: Primary | ICD-10-CM

## 2021-05-25 LAB
BUN SERPL-MCNC: 46 MG/DL (ref 7–18)
CALCIUM SERPL-MCNC: 9.3 MG/DL (ref 8.8–10.2)
CHLORIDE SERPL-SCNC: 102 MEQ/L (ref 98–107)
CO2 SERPL-SCNC: 30 MEQ/L (ref 21–32)
CREAT SERPL-MCNC: 1.17 MG/DL (ref 0.55–1.3)
DIGOXIN SERPL-MCNC: 1 NG/ML (ref 0.5–2)
GFR SERPL CREATININE-BSD FRML MDRD: 46.4 ML/MIN/{1.73_M2} (ref 32–?)
GLUCOSE SERPL-MCNC: 147 MG/DL (ref 70–100)
HCT VFR BLD AUTO: 27.7 % (ref 36–47)
HGB BLD-MCNC: 8.9 G/DL (ref 12–15.5)
MAGNESIUM SERPL-MCNC: 2.3 MG/DL (ref 1.8–2.4)
MCH RBC QN AUTO: 31.3 PG (ref 27–33)
MCHC RBC AUTO-ENTMCNC: 32.1 G/DL (ref 32–36.5)
MCV RBC AUTO: 97.5 FL (ref 80–96)
PLATELET # BLD AUTO: 166 10^3/UL (ref 150–450)
POTASSIUM SERPL-SCNC: 4.2 MEQ/L (ref 3.5–5.1)
RBC # BLD AUTO: 2.84 10^6/UL (ref 4–5.4)
SODIUM SERPL-SCNC: 139 MEQ/L (ref 136–145)
TSH SERPL DL<=0.005 MIU/L-ACNC: 0.58 UIU/ML (ref 0.36–3.74)
WBC # BLD AUTO: 3.2 10^3/UL (ref 4–10)

## 2021-06-04 ENCOUNTER — HOSPITAL ENCOUNTER (OUTPATIENT)
Dept: HOSPITAL 53 - SSVSNF3 | Age: 86
End: 2021-06-04
Attending: PHYSICIAN ASSISTANT
Payer: MEDICARE

## 2021-06-04 DIAGNOSIS — R53.83: ICD-10-CM

## 2021-06-04 DIAGNOSIS — D64.9: Primary | ICD-10-CM

## 2021-06-04 LAB
BUN SERPL-MCNC: 37 MG/DL (ref 7–18)
CALCIUM SERPL-MCNC: 8.8 MG/DL (ref 8.8–10.2)
CHLORIDE SERPL-SCNC: 102 MEQ/L (ref 98–107)
CO2 SERPL-SCNC: 27 MEQ/L (ref 21–32)
CREAT SERPL-MCNC: 1.37 MG/DL (ref 0.55–1.3)
GFR SERPL CREATININE-BSD FRML MDRD: 38.6 ML/MIN/{1.73_M2} (ref 32–?)
GLUCOSE SERPL-MCNC: 159 MG/DL (ref 70–100)
HCT VFR BLD AUTO: 31.1 % (ref 36–47)
HGB BLD-MCNC: 9.8 G/DL (ref 12–15.5)
IRON SATN MFR SERPL: 25.6 % (ref 13.2–45)
IRON SERPL-MCNC: 99 UG/DL (ref 50–170)
MCH RBC QN AUTO: 30.9 PG (ref 27–33)
MCHC RBC AUTO-ENTMCNC: 31.5 G/DL (ref 32–36.5)
MCV RBC AUTO: 98.1 FL (ref 80–96)
PLATELET # BLD AUTO: 197 10^3/UL (ref 150–450)
POTASSIUM SERPL-SCNC: 3.8 MEQ/L (ref 3.5–5.1)
RBC # BLD AUTO: 3.17 10^6/UL (ref 4–5.4)
SODIUM SERPL-SCNC: 139 MEQ/L (ref 136–145)
T4 SERPL-MCNC: 9.7 UG/DL (ref 4.5–12)
TIBC SERPL-MCNC: 386 UG/DL (ref 250–450)
TSH SERPL DL<=0.005 MIU/L-ACNC: 0.91 UIU/ML (ref 0.36–3.74)
WBC # BLD AUTO: 4.4 10^3/UL (ref 4–10)